# Patient Record
Sex: FEMALE | Race: WHITE | NOT HISPANIC OR LATINO
[De-identification: names, ages, dates, MRNs, and addresses within clinical notes are randomized per-mention and may not be internally consistent; named-entity substitution may affect disease eponyms.]

---

## 2017-10-30 ENCOUNTER — APPOINTMENT (OUTPATIENT)
Dept: GERIATRICS | Facility: CLINIC | Age: 72
End: 2017-10-30
Payer: MEDICARE

## 2017-10-30 VITALS
HEART RATE: 75 BPM | SYSTOLIC BLOOD PRESSURE: 110 MMHG | WEIGHT: 90 LBS | TEMPERATURE: 98.7 F | HEIGHT: 58 IN | OXYGEN SATURATION: 98 % | DIASTOLIC BLOOD PRESSURE: 62 MMHG | BODY MASS INDEX: 18.89 KG/M2

## 2017-10-30 DIAGNOSIS — Z60.2 PROBLEMS RELATED TO LIVING ALONE: ICD-10-CM

## 2017-10-30 DIAGNOSIS — R63.4 ABNORMAL WEIGHT LOSS: ICD-10-CM

## 2017-10-30 DIAGNOSIS — R45.1 RESTLESSNESS AND AGITATION: ICD-10-CM

## 2017-10-30 DIAGNOSIS — Z00.00 ENCOUNTER FOR GENERAL ADULT MEDICAL EXAMINATION W/OUT ABNORMAL FINDINGS: ICD-10-CM

## 2017-10-30 DIAGNOSIS — R41.3 OTHER AMNESIA: ICD-10-CM

## 2017-10-30 PROCEDURE — 99204 OFFICE O/P NEW MOD 45 MIN: CPT | Mod: 25

## 2017-10-30 PROCEDURE — G0505: CPT

## 2017-10-30 RX ORDER — FLUCONAZOLE 150 MG/1
150 TABLET ORAL
Qty: 1 | Refills: 0 | Status: DISCONTINUED | COMMUNITY
Start: 2017-06-25

## 2017-10-30 RX ORDER — DONEPEZIL HYDROCHLORIDE 5 MG/1
5 TABLET ORAL
Qty: 30 | Refills: 0 | Status: DISCONTINUED | COMMUNITY
Start: 2017-04-03

## 2017-10-30 RX ORDER — MEMANTINE HYDROCHLORIDE 7 MG/1
7 CAPSULE, EXTENDED RELEASE ORAL
Qty: 30 | Refills: 0 | Status: DISCONTINUED | COMMUNITY
Start: 2017-06-16

## 2017-10-30 RX ORDER — RIVASTIGMINE TARTRATE 1.5 MG/1
1.5 CAPSULE ORAL
Qty: 30 | Refills: 0 | Status: ACTIVE | COMMUNITY
Start: 2017-07-10

## 2017-10-30 RX ORDER — NITROFURANTOIN (MONOHYDRATE/MACROCRYSTALS) 25; 75 MG/1; MG/1
100 CAPSULE ORAL
Qty: 14 | Refills: 0 | Status: DISCONTINUED | COMMUNITY
Start: 2017-06-25

## 2017-10-30 RX ORDER — VALACYCLOVIR 500 MG/1
500 TABLET, FILM COATED ORAL
Qty: 6 | Refills: 0 | Status: DISCONTINUED | COMMUNITY
Start: 2017-06-08

## 2017-10-30 RX ORDER — LEVOTHYROXINE SODIUM 75 UG/1
75 TABLET ORAL
Qty: 30 | Refills: 0 | Status: ACTIVE | COMMUNITY
Start: 2017-04-11

## 2017-10-30 SDOH — SOCIAL STABILITY - SOCIAL INSECURITY: PROBLEMS RELATED TO LIVING ALONE: Z60.2

## 2017-10-31 PROBLEM — Z60.2 LIVING ALONE: Status: ACTIVE | Noted: 2017-10-31

## 2017-10-31 PROBLEM — Z00.00 ENCOUNTER FOR PREVENTIVE HEALTH EXAMINATION: Status: ACTIVE | Noted: 2017-07-07

## 2017-10-31 LAB
ALBUMIN SERPL ELPH-MCNC: 4.6 G/DL
ALP BLD-CCNC: 69 U/L
ALT SERPL-CCNC: 21 U/L
ANION GAP SERPL CALC-SCNC: 17 MMOL/L
AST SERPL-CCNC: 22 U/L
BASOPHILS # BLD AUTO: 0.04 K/UL
BASOPHILS NFR BLD AUTO: 0.5 %
BILIRUB SERPL-MCNC: 0.2 MG/DL
BUN SERPL-MCNC: 13 MG/DL
CALCIUM SERPL-MCNC: 10.2 MG/DL
CHLORIDE SERPL-SCNC: 106 MMOL/L
CHOLEST SERPL-MCNC: 237 MG/DL
CHOLEST/HDLC SERPL: 3.4 RATIO
CO2 SERPL-SCNC: 24 MMOL/L
CREAT SERPL-MCNC: 1.09 MG/DL
EOSINOPHIL # BLD AUTO: 0.06 K/UL
EOSINOPHIL NFR BLD AUTO: 0.7 %
GLUCOSE SERPL-MCNC: 94 MG/DL
HCT VFR BLD CALC: 44.2 %
HDLC SERPL-MCNC: 70 MG/DL
HGB BLD-MCNC: 14.9 G/DL
IMM GRANULOCYTES NFR BLD AUTO: 0.1 %
LDLC SERPL CALC-MCNC: 142 MG/DL
LYMPHOCYTES # BLD AUTO: 2.08 K/UL
LYMPHOCYTES NFR BLD AUTO: 24.5 %
MAN DIFF?: NORMAL
MCHC RBC-ENTMCNC: 32.2 PG
MCHC RBC-ENTMCNC: 33.7 GM/DL
MCV RBC AUTO: 95.5 FL
MONOCYTES # BLD AUTO: 0.53 K/UL
MONOCYTES NFR BLD AUTO: 6.3 %
NEUTROPHILS # BLD AUTO: 5.76 K/UL
NEUTROPHILS NFR BLD AUTO: 67.9 %
PLATELET # BLD AUTO: 251 K/UL
POTASSIUM SERPL-SCNC: 4.7 MMOL/L
PROT SERPL-MCNC: 7.4 G/DL
RBC # BLD: 4.63 M/UL
RBC # FLD: 12.4 %
SODIUM SERPL-SCNC: 147 MMOL/L
T4 SERPL-MCNC: 10.3 UG/DL
TRIGL SERPL-MCNC: 124 MG/DL
TSH SERPL-ACNC: 0.77 UIU/ML
WBC # FLD AUTO: 8.48 K/UL

## 2019-03-04 PROBLEM — R41.3 MEMORY LOSS: Status: ACTIVE | Noted: 2017-10-30

## 2022-09-08 ENCOUNTER — INPATIENT (INPATIENT)
Facility: HOSPITAL | Age: 77
LOS: 7 days | Discharge: HOME HEALTH | End: 2022-09-16
Attending: STUDENT IN AN ORGANIZED HEALTH CARE EDUCATION/TRAINING PROGRAM | Admitting: STUDENT IN AN ORGANIZED HEALTH CARE EDUCATION/TRAINING PROGRAM

## 2022-09-08 VITALS
TEMPERATURE: 101 F | SYSTOLIC BLOOD PRESSURE: 133 MMHG | RESPIRATION RATE: 18 BRPM | DIASTOLIC BLOOD PRESSURE: 58 MMHG | OXYGEN SATURATION: 99 % | HEART RATE: 87 BPM

## 2022-09-08 DIAGNOSIS — I61.9 NONTRAUMATIC INTRACEREBRAL HEMORRHAGE, UNSPECIFIED: ICD-10-CM

## 2022-09-08 DIAGNOSIS — Z90.89 ACQUIRED ABSENCE OF OTHER ORGANS: Chronic | ICD-10-CM

## 2022-09-08 DIAGNOSIS — Z87.74 PERSONAL HISTORY OF (CORRECTED) CONGENITAL MALFORMATIONS OF HEART AND CIRCULATORY SYSTEM: Chronic | ICD-10-CM

## 2022-09-08 LAB
A1C WITH ESTIMATED AVERAGE GLUCOSE RESULT: 5.1 % — SIGNIFICANT CHANGE UP (ref 4–5.6)
ALBUMIN SERPL ELPH-MCNC: 4.7 G/DL — SIGNIFICANT CHANGE UP (ref 3.3–5)
ALP SERPL-CCNC: 73 U/L — SIGNIFICANT CHANGE UP (ref 40–120)
ALT FLD-CCNC: 29 U/L — SIGNIFICANT CHANGE UP (ref 4–33)
ANION GAP SERPL CALC-SCNC: 13 MMOL/L — SIGNIFICANT CHANGE UP (ref 7–14)
ANION GAP SERPL CALC-SCNC: 15 MMOL/L — HIGH (ref 7–14)
APTT BLD: 27.5 SEC — SIGNIFICANT CHANGE UP (ref 27–36.3)
AST SERPL-CCNC: 30 U/L — SIGNIFICANT CHANGE UP (ref 4–32)
BASE EXCESS BLDV CALC-SCNC: 3.7 MMOL/L — HIGH (ref -2–3)
BASOPHILS # BLD AUTO: 0.01 K/UL — SIGNIFICANT CHANGE UP (ref 0–0.2)
BASOPHILS NFR BLD AUTO: 0.1 % — SIGNIFICANT CHANGE UP (ref 0–2)
BILIRUB SERPL-MCNC: 0.8 MG/DL — SIGNIFICANT CHANGE UP (ref 0.2–1.2)
BLOOD GAS VENOUS COMPREHENSIVE RESULT: SIGNIFICANT CHANGE UP
BUN SERPL-MCNC: 12 MG/DL — SIGNIFICANT CHANGE UP (ref 7–23)
BUN SERPL-MCNC: 13 MG/DL — SIGNIFICANT CHANGE UP (ref 7–23)
CALCIUM SERPL-MCNC: 10.2 MG/DL — SIGNIFICANT CHANGE UP (ref 8.4–10.5)
CALCIUM SERPL-MCNC: 9.4 MG/DL — SIGNIFICANT CHANGE UP (ref 8.4–10.5)
CHLORIDE BLDV-SCNC: 100 MMOL/L — SIGNIFICANT CHANGE UP (ref 96–108)
CHLORIDE SERPL-SCNC: 101 MMOL/L — SIGNIFICANT CHANGE UP (ref 98–107)
CHLORIDE SERPL-SCNC: 96 MMOL/L — LOW (ref 98–107)
CO2 BLDV-SCNC: 28.9 MMOL/L — HIGH (ref 22–26)
CO2 SERPL-SCNC: 24 MMOL/L — SIGNIFICANT CHANGE UP (ref 22–31)
CO2 SERPL-SCNC: 25 MMOL/L — SIGNIFICANT CHANGE UP (ref 22–31)
CREAT SERPL-MCNC: 0.67 MG/DL — SIGNIFICANT CHANGE UP (ref 0.5–1.3)
CREAT SERPL-MCNC: 0.7 MG/DL — SIGNIFICANT CHANGE UP (ref 0.5–1.3)
EGFR: 91 ML/MIN/1.73M2 — SIGNIFICANT CHANGE UP
EGFR: 92 ML/MIN/1.73M2 — SIGNIFICANT CHANGE UP
EOSINOPHIL # BLD AUTO: 0.03 K/UL — SIGNIFICANT CHANGE UP (ref 0–0.5)
EOSINOPHIL NFR BLD AUTO: 0.2 % — SIGNIFICANT CHANGE UP (ref 0–6)
ESTIMATED AVERAGE GLUCOSE: 100 — SIGNIFICANT CHANGE UP
GAS PNL BLDV: 132 MMOL/L — LOW (ref 136–145)
GAS PNL BLDV: SIGNIFICANT CHANGE UP
GLUCOSE BLDV-MCNC: 95 MG/DL — SIGNIFICANT CHANGE UP (ref 70–99)
GLUCOSE SERPL-MCNC: 92 MG/DL — SIGNIFICANT CHANGE UP (ref 70–99)
GLUCOSE SERPL-MCNC: 96 MG/DL — SIGNIFICANT CHANGE UP (ref 70–99)
HCO3 BLDV-SCNC: 28 MMOL/L — SIGNIFICANT CHANGE UP (ref 22–29)
HCT VFR BLD CALC: 45.6 % — HIGH (ref 34.5–45)
HCT VFR BLDA CALC: 44 % — SIGNIFICANT CHANGE UP (ref 34.5–46.5)
HGB BLD CALC-MCNC: 14.6 G/DL — SIGNIFICANT CHANGE UP (ref 11.5–15.5)
HGB BLD-MCNC: 15.5 G/DL — SIGNIFICANT CHANGE UP (ref 11.5–15.5)
IANC: 9.66 K/UL — HIGH (ref 1.8–7.4)
IMM GRANULOCYTES NFR BLD AUTO: 0.6 % — SIGNIFICANT CHANGE UP (ref 0–1.5)
INR BLD: 1.07 RATIO — SIGNIFICANT CHANGE UP (ref 0.88–1.16)
LACTATE BLDV-MCNC: 1.3 MMOL/L — SIGNIFICANT CHANGE UP (ref 0.5–2)
LYMPHOCYTES # BLD AUTO: 1.75 K/UL — SIGNIFICANT CHANGE UP (ref 1–3.3)
LYMPHOCYTES # BLD AUTO: 14.5 % — SIGNIFICANT CHANGE UP (ref 13–44)
MCHC RBC-ENTMCNC: 31.8 PG — SIGNIFICANT CHANGE UP (ref 27–34)
MCHC RBC-ENTMCNC: 34 GM/DL — SIGNIFICANT CHANGE UP (ref 32–36)
MCV RBC AUTO: 93.4 FL — SIGNIFICANT CHANGE UP (ref 80–100)
MONOCYTES # BLD AUTO: 0.56 K/UL — SIGNIFICANT CHANGE UP (ref 0–0.9)
MONOCYTES NFR BLD AUTO: 4.6 % — SIGNIFICANT CHANGE UP (ref 2–14)
NEUTROPHILS # BLD AUTO: 9.66 K/UL — HIGH (ref 1.8–7.4)
NEUTROPHILS NFR BLD AUTO: 80 % — HIGH (ref 43–77)
NRBC # BLD: 0 /100 WBCS — SIGNIFICANT CHANGE UP (ref 0–0)
NRBC # FLD: 0 K/UL — SIGNIFICANT CHANGE UP (ref 0–0)
PCO2 BLDV: 39 MMHG — SIGNIFICANT CHANGE UP (ref 39–42)
PH BLDV: 7.46 — HIGH (ref 7.32–7.43)
PLATELET # BLD AUTO: 212 K/UL — SIGNIFICANT CHANGE UP (ref 150–400)
PO2 BLDV: 24 MMHG — SIGNIFICANT CHANGE UP
POTASSIUM BLDV-SCNC: 4.6 MMOL/L — SIGNIFICANT CHANGE UP (ref 3.5–5.1)
POTASSIUM SERPL-MCNC: 4 MMOL/L — SIGNIFICANT CHANGE UP (ref 3.5–5.3)
POTASSIUM SERPL-MCNC: 5.5 MMOL/L — HIGH (ref 3.5–5.3)
POTASSIUM SERPL-SCNC: 4 MMOL/L — SIGNIFICANT CHANGE UP (ref 3.5–5.3)
POTASSIUM SERPL-SCNC: 5.5 MMOL/L — HIGH (ref 3.5–5.3)
PROT SERPL-MCNC: 7.6 G/DL — SIGNIFICANT CHANGE UP (ref 6–8.3)
PROTHROM AB SERPL-ACNC: 12.4 SEC — SIGNIFICANT CHANGE UP (ref 10.5–13.4)
RBC # BLD: 4.88 M/UL — SIGNIFICANT CHANGE UP (ref 3.8–5.2)
RBC # FLD: 12.9 % — SIGNIFICANT CHANGE UP (ref 10.3–14.5)
SAO2 % BLDV: 32.2 % — SIGNIFICANT CHANGE UP
SARS-COV-2 RNA SPEC QL NAA+PROBE: SIGNIFICANT CHANGE UP
SODIUM SERPL-SCNC: 135 MMOL/L — SIGNIFICANT CHANGE UP (ref 135–145)
SODIUM SERPL-SCNC: 139 MMOL/L — SIGNIFICANT CHANGE UP (ref 135–145)
TROPONIN T, HIGH SENSITIVITY RESULT: 13 NG/L — SIGNIFICANT CHANGE UP
WBC # BLD: 12.08 K/UL — HIGH (ref 3.8–10.5)
WBC # FLD AUTO: 12.08 K/UL — HIGH (ref 3.8–10.5)

## 2022-09-08 PROCEDURE — 70496 CT ANGIOGRAPHY HEAD: CPT | Mod: 26,MA

## 2022-09-08 PROCEDURE — 71046 X-RAY EXAM CHEST 2 VIEWS: CPT | Mod: 26

## 2022-09-08 PROCEDURE — 99291 CRITICAL CARE FIRST HOUR: CPT | Mod: GC

## 2022-09-08 PROCEDURE — 70498 CT ANGIOGRAPHY NECK: CPT | Mod: 26,MA

## 2022-09-08 PROCEDURE — 99291 CRITICAL CARE FIRST HOUR: CPT

## 2022-09-08 PROCEDURE — 70496 CT ANGIOGRAPHY HEAD: CPT | Mod: 26,77

## 2022-09-08 RX ORDER — LEVOTHYROXINE SODIUM 125 MCG
75 TABLET ORAL DAILY
Refills: 0 | Status: DISCONTINUED | OUTPATIENT
Start: 2022-09-08 | End: 2022-09-15

## 2022-09-08 RX ORDER — CHLORHEXIDINE GLUCONATE 213 G/1000ML
1 SOLUTION TOPICAL
Refills: 0 | Status: DISCONTINUED | OUTPATIENT
Start: 2022-09-08 | End: 2022-09-09

## 2022-09-08 RX ORDER — INFLUENZA VIRUS VACCINE 15; 15; 15; 15 UG/.5ML; UG/.5ML; UG/.5ML; UG/.5ML
0.7 SUSPENSION INTRAMUSCULAR ONCE
Refills: 0 | Status: DISCONTINUED | OUTPATIENT
Start: 2022-09-08 | End: 2022-09-16

## 2022-09-08 RX ORDER — SODIUM CHLORIDE 9 MG/ML
1000 INJECTION INTRAMUSCULAR; INTRAVENOUS; SUBCUTANEOUS
Refills: 0 | Status: DISCONTINUED | OUTPATIENT
Start: 2022-09-08 | End: 2022-09-09

## 2022-09-08 RX ORDER — SODIUM CHLORIDE 9 MG/ML
1000 INJECTION, SOLUTION INTRAVENOUS
Refills: 0 | Status: DISCONTINUED | OUTPATIENT
Start: 2022-09-08 | End: 2022-09-08

## 2022-09-08 RX ORDER — ACETAMINOPHEN 500 MG
575 TABLET ORAL ONCE
Refills: 0 | Status: COMPLETED | OUTPATIENT
Start: 2022-09-08 | End: 2022-09-08

## 2022-09-08 RX ADMIN — SODIUM CHLORIDE 70 MILLILITER(S): 9 INJECTION, SOLUTION INTRAVENOUS at 20:16

## 2022-09-08 RX ADMIN — SODIUM CHLORIDE 75 MILLILITER(S): 9 INJECTION INTRAMUSCULAR; INTRAVENOUS; SUBCUTANEOUS at 23:22

## 2022-09-08 RX ADMIN — Medication 230 MILLIGRAM(S): at 23:07

## 2022-09-08 NOTE — ED PROVIDER NOTE - NS ED ROS FT
Gen: No F/C/NS  Head: +fall +head trauma  Eyes: No changes in vision   Resp: No cough or trouble breathing  Cardiovascular: No chest pain or palpitation  Gastroenteric: No N/V/D  :  No change in urine output, dysuria or hematuria   MS: +bruise +leg pain   Neuro: +headache; no abnormal movements  Skin: No new rash

## 2022-09-08 NOTE — ED ADULT NURSE REASSESSMENT NOTE - NS ED NURSE REASSESS COMMENT FT1
Patient is alert and verbally responsive with Hx of early dementia. Patient sustained a fall and hit her head . Patient came as a stroke code then cancelled. No neurological deci fit noted. ct scan shows that patient has a brain bleed. MICU consult in placed. Cardiac monitor is  progress.

## 2022-09-08 NOTE — CONSULT NOTE ADULT - TIME BILLING
76-year-old right-handed lady first evaluated at [facility] on 9/9/2022 with left visual field deficit and agitation.  History and exam as above.  ROS otherwise negative.  CT head (9/8/2022) to my eye showed a moderate sized right occipital lobar hemorrhage, measured at 4 x 2 x 3.9 cm.  CTA neck and head (9/8/2022) reportedly showed (not obvious to my eye) increased vascularity in the region medial to the hemorrhage, consistent with a possible AVM.    Impression.  At baseline suspect at least mild-moderate and possibly moderate dementia.  Presented with agitation and left homonymous hemianopia, due to a right occipital lobar hemorrhage.  Etiology may be amyloid angiopathy, but slightly abnormal vasculature on CTA raised the possibility of an underlying AVM.  Suggest.  MRI brain with contrast; in 3-7 days (1 week per neurosurgery, but I think CTA can be repeated earlier if she is otherwise ready for discharge); if repeat CTA is again suggestive of an AVM, then transfer to [facility] for conventional cerebral angiography; if repeat CTA is unremarkable, then no further neurovascular investigation, although can repeat MRI with contrast/MRA neck and head in about 2 months; keep blood pressure less than 140/90; PT/OT/speech therapy.

## 2022-09-08 NOTE — ED PROVIDER NOTE - PROGRESS NOTE DETAILS
Dano CLAY (PGY-3)   spoke with micu who states nsgy's plan is 4 hr ct/ctv (9PM). potential xfer to [facility] next week. micu attg accepted pt

## 2022-09-08 NOTE — ED PROVIDER NOTE - CLINICAL SUMMARY MEDICAL DECISION MAKING FREE TEXT BOX
75yo F PMH hypothyroidism, dementia, allergies (Xolaire injections monthly) presents today with c/f fall, head trauma. C/f gait changes and dec peripheral vision to L side. Fall risk. Plan to obtain CT head, obtain blood work, urine, give analgesics, reassess.

## 2022-09-08 NOTE — PATIENT PROFILE ADULT - FALL HARM RISK - HARM RISK INTERVENTIONS
Communicate Risk of Fall with Harm to all staff/Reinforce activity limits and safety measures with patient and family/Tailored Fall Risk Interventions/Visual Cue: Yellow wristband and red socks/Bed in lowest position, wheels locked, appropriate side rails in place/Call bell, personal items and telephone in reach/Instruct patient to call for assistance before getting out of bed or chair/Non-slip footwear when patient is out of bed/[place] to call system/Physically safe environment - no spills, clutter or unnecessary equipment/Purposeful Proactive Rounding/Room/bathroom lighting operational, light cord in reach Assistance with ambulation/Assistance OOB with selected safe patient handling equipment/Communicate Risk of Fall with Harm to all staff/Reinforce activity limits and safety measures with patient and family/Review medications for side effects contributing to fall risk/Sit up slowly, dangle for a short time, stand at bedside before walking/Tailored Fall Risk Interventions/Toileting schedule using arm’s reach rule for commode and bathroom/Visual Cue: Yellow wristband and red socks/Bed in lowest position, wheels locked, appropriate side rails in place/Call bell, personal items and telephone in reach/Instruct patient to call for assistance before getting out of bed or chair/Non-slip footwear when patient is out of bed/[place] to call system/Physically safe environment - no spills, clutter or unnecessary equipment/Purposeful Proactive Rounding/Room/bathroom lighting operational, light cord in reach

## 2022-09-08 NOTE — ED PROVIDER NOTE - OBJECTIVE STATEMENT
77yo F PMH hypothyroidism, dementia, allergies (Xolaire injections monthly). Per daughter patient was not her normal self today. Usually very active. She told daugher that she was not feeling right, moving very slow. Daughter recommended a shower. In the shower bruised her leg and head. Brother with patient yesterday, states that she was "really off" yesterday - thinking "night was day and day was night." On the way to the allergist today but upon noting the head trauma, daughter brought the patient to the hospital for further evaluation. Patient does not recall falling. Per daughter - usually a 30 second turnover in memory. Pt cannot remember how or when she fell but family just noted bruise today, may have fallen or injured self x 2 days ago. No nausea or vomiting. No chest pain/abd pain. Currently c/o headache. Family says pt seems low energy compared to her usual self.

## 2022-09-08 NOTE — CONSULT NOTE ADULT - ASSESSMENT
75 yo F PMH dementia presenting w L visual loss and confusion, CTH shows R occpital IPH, CTA shows slightly increased vascularity in the right occipital lobe medial to the parenchymal hemorrhage suspicious for an anterior venous malformation    PLAN:  - Interval CTH 4-6h  - CTV head  - Q1h neuro checks  - Hold all AC    Case discussed with attending neurosurgeon Dr. Lawrence

## 2022-09-08 NOTE — H&P ADULT - ASSESSMENT
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      #Neuro:  ##R occipital IPH  CTH 9/8 @5PM:  1 x 2.1 x 3.9 cm hemorrhage in right occipital lobe.  CTA 9/9: c/f anterior venous malformation  P/w L hemianopsia  [ ] 4 hour repeat CTH - ordered and confirmed with CT dept  [ ] CT venogram - to eval venous malformation , to be done with 4 hour repeat CTH  neuro check q1h  likely 2/2 trauma +/- venous malformation      #Cardiovascular:  -BP  138/58   Strict BP control goal <160/90 w/ Cardene drip if needed    #Pulmonary:  -CXR clear lungs  -on RA, no active issue    #FEN/GI:  -NPO for now pending repeat CTH  -hx of celiac dz    #Renal:  -IVF to prevent contrast VANE    #:  -UA, possible UCx    #ID:  ##Fever  -T 100.6, WBC 13, may be reactive from IPH, no localizing symptoms  CXR clear lungs  [ ] f/u bcx sent by  ED  [ ] UA and UCx ordered  Monitor off abx    #Heme:  -No chemical AC due to bleed  SCDs    #Endo:  -Hx of grave's dz, s/p radioactive iodine, now hypothyroid on synthroid  c/w home synthroid    #Ethics:  -2 children Sinan [phone]  Samantha [phone]  full code 76F PMH hypothyroidism, Celiac disease, allergies, dementia (B/l aaox2), 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      #Neuro:  ##R occipital IPH  CTH 9/8 @5PM:  1 x 2.1 x 3.9 cm hemorrhage in right occipital lobe.  CTA 9/9: c/f anterior venous malformation  P/w L hemianopsia  [ ] 4 hour repeat CTH - ordered and confirmed with CT dept  [ ] CT venogram - to eval venous malformation , to be done with 4 hour repeat CTH  neuro check q1h  likely 2/2 trauma +/- venous malformation  neurosx: no acute intervention per phone discussion  neurology: MR brain, TTE ordered. bedside dysphagia tmr      #Cardiovascular:  -BP  138/58   Strict BP control goal <160/90 w/ Cardene drip if needed    #Pulmonary:  -CXR clear lungs  -on RA, no active issue    #FEN/GI:  -NPO for now pending repeat CTH  -hx of celiac dz    #Renal:  -IVF to prevent contrast VANE    #:  -UA, possible UCx    #ID:  ##Fever  -T 100.6, WBC 13, may be reactive from IPH, no localizing symptoms  CXR clear lungs  [ ] f/u bcx sent by  ED  [ ] UA and UCx ordered  Monitor off abx    #Heme:  -No chemical AC due to bleed  SCDs    #Endo:  -Hx of grave's dz, s/p radioactive iodine, now hypothyroid on synthroid  c/w home synthroid    #Ethics:  -2 children Sinan [phone]  Samantha [phone]  full code

## 2022-09-08 NOTE — ED ADULT NURSE NOTE - OBJECTIVE STATEMENT
Vinay RN: Pt presents A&Ox3, brought in by daughter for increased confusion and decrease in mobility that started yesterday. PMHx of dementia, but per daughter pt has been more confused and walking slower. Daughter states she also noticed a bruise on pt L thight area and L temporal. Pt does not remember falling. Pt walks around at home w/o any assistance. Code stroke activated by MD. Pt moved from CT to rm13, respirations even and unlabored, sating 100% on RA, NSR on monitor. Old bruising noted on L thigh and L side of head. No active bleeding present. +pulse motor sensation and strength equal and present in all 4 exts, PERRLA intact. Pt denies any chest pain, sob, headache, N/V/D, or visual disturbances. 20g IV placed in L AC, labs sent . Neuro at bedside. Report given to primary RN. bed in lowest position, side rails up, call bell in hand, safety maintained.

## 2022-09-08 NOTE — CHART NOTE - NSCHARTNOTEFT_GEN_A_CORE
Case discussed with rads resident. Stable size of IPH. AVM appeared more prominent on CT venogram then initial CTA. Full report to be dictated in the AM. Stable exam discussed with patient and daughter at bedside.    AS, pgy3

## 2022-09-08 NOTE — ED ADULT TRIAGE NOTE - CHIEF COMPLAINT QUOTE
Pt AOX2 (unsure of time and place) Hx of dementia; pt suffered a head injury possible fall, bruise noted to Left temple area; pt cannot remember how or when she fell but family just noted bruise today, may have fallen or injured self x 2 days ago; pt has Hx of allergic hives and gets Xolaire injection monthly and takes synthroid; family says pt seems low energy compared to her usual self
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
## 2022-09-08 NOTE — CONSULT NOTE ADULT - SUBJECTIVE AND OBJECTIVE BOX
NEUROSURGERY CONSULT    Consulted for: R occipital IPH    HPI: 77 yo female PMH dementia, pediatric heart surgery for patent ductus arteriosus, hypothyroidism presenting for L sided visual loss and increasing confusion. At baseline, the patient typically knows the location and date but does fluctuate with the date but forgets recent conversations and new information presented to her, but is otherwise independent in ambulation. She was noted to have a fall and confusion at around 2200 last night and this morning was noted to have difficulty seeing on the L side of her visual field. The patient had been noted to have increasing forgetfulness over the last week as well as per the son and daughter. Patient went to an allergist today and then was noted at that time to be having difficulty seeing things on the L side of her body. Does have a recent travel history to [facility]. Patient stated that she is feeling well and did not have any complaints.     RADIOLOGY:     MEDS:      PHYSICAL EXAM:  Vital Signs Last 24 Hrs  T(C): 38.1 (08 Sep 2022 14:29), Max: 38.1 (08 Sep 2022 14:29)  T(F): 100.6 (08 Sep 2022 14:29), Max: 100.6 (08 Sep 2022 14:29)  HR: 87 (08 Sep 2022 14:29) (87 - 87)  BP: 133/58 (08 Sep 2022 14:29) (133/58 - 133/58)  BP(mean): --  RR: 18 (08 Sep 2022 14:29) (18 - 18)  SpO2: 99% (08 Sep 2022 14:29) (99% - 99%)        LABS:                        15.5   12.08 )-----------( 212      ( 08 Sep 2022 16:56 )             45.6           PT/INR - ( 08 Sep 2022 16:56 )   PT: 12.4 sec;   INR: 1.07 ratio         PTT - ( 08 Sep 2022 16:56 )  PTT:27.5 sec           NEUROSURGERY CONSULT    Consulted for: R occipital IPH    HPI: 75 yo female PMH dementia, pediatric heart surgery for patent ductus arteriosus, hypothyroidism presenting for L sided visual loss and increasing confusion. At baseline, the patient typically knows the location and date but does fluctuate with the date but forgets recent conversations and new information presented to her, but is otherwise independent in ambulation. She was noted to have a fall and confusion at around 2200 last night and this morning was noted to have difficulty seeing on the L side of her visual field. The patient had been noted to have increasing forgetfulness over the last week as well as per the son and daughter. Patient went to an allergist today and then was noted at that time to be having difficulty seeing things on the L side of her body. Does have a recent travel history to [facility]. Patient stated that she is feeling well and did not have any complaints.     RADIOLOGY:   < from: CT Brain Stroke Protocol (09.08.22 @ 17:02) >  IMPRESSION:  Acute intracranial hemorrhage within the right occipital lobe with   surrounding edema and extension of hemorrhage into the right lateral   ventricle. Mild mass effect on the right lateral ventricle. No midline   shift.    < from: CT Angio Neck Stroke Protocol w/ IV Cont (09.08.22 @ 17:02) >  IMPRESSION: Normal CTA of the neck. There is slightly increased   vascularity in the right occipital lobe medial to the parenchymal   hemorrhage suspicious for an anterior venous malformation.    PHYSICAL EXAM:  Vital Signs Last 24 Hrs  T(C): 38.1 (08 Sep 2022 14:29), Max: 38.1 (08 Sep 2022 14:29)  T(F): 100.6 (08 Sep 2022 14:29), Max: 100.6 (08 Sep 2022 14:29)  HR: 87 (08 Sep 2022 14:29) (87 - 87)  BP: 133/58 (08 Sep 2022 14:29) (133/58 - 133/58)  BP(mean): --  RR: 18 (08 Sep 2022 14:29) (18 - 18)  SpO2: 99% (08 Sep 2022 14:29) (99% - 99%)    AOx2, appropriate, follows commands  PERRL, EOMI, face symmetrical, L peripheral vision loss  KHAN x 4 with 5/5 strength   Sensation intact to light touch   No pronator drift         LABS:                        15.5   12.08 )-----------( 212      ( 08 Sep 2022 16:56 )             45.6           PT/INR - ( 08 Sep 2022 16:56 )   PT: 12.4 sec;   INR: 1.07 ratio         PTT - ( 08 Sep 2022 16:56 )  PTT:27.5 sec

## 2022-09-08 NOTE — CONSULT NOTE ADULT - SUBJECTIVE AND OBJECTIVE BOX
HPI:  Nicole Casillas is a 76 year old RH female with a past medical history of dementia, pediatric heart surgery for patent ductus arteriosus, hypothyroidism presenting for L sided visual loss and increasing confusion. At baseline, the patient typically knows the location and date but does fluctuate with the date but forgets recent conversations and new information presented to her, but is otherwise independent in ambulation. She was noted to have a fall and confusion at around 2200 last night and this morning was noted to have difficulty seeing on the L side of her visual field. The patient had been noted to have increasing forgetfulness over the last week as well as per the son and daughter. Patient went to an allergist today and then was noted at that time to be having difficulty seeing things on the L side of her body. Does have a recent travel history to [facility]. Patient stated that she is feeling well and did not have any complaints.     NIHSS: 4  MRS: 1  ICH: 1    REVIEW OF SYSTEMS    A 10-system ROS was performed and is negative except for those items noted above and/or in the HPI.    PAST MEDICAL & SURGICAL HISTORY:  dementia  pediatric heart surgery for patent ductus arteriosus  hypothyroidism    FAMILY HISTORY:  non-contributory    SOCIAL HISTORY:   T/E/D: denied smoking, alcohol, illicit drugs  Occupation: former hairdresser  Lives with: family in independent unit    ALLERGIES/INTOLERANCES:  Allergies  Allergy Status Unknown    Intolerances    VITALS & EXAMINATION:  Vital Signs Last 24 Hrs  T(C): 38.1 (08 Sep 2022 14:29), Max: 38.1 (08 Sep 2022 14:29)  T(F): 100.6 (08 Sep 2022 14:29), Max: 100.6 (08 Sep 2022 14:29)  HR: 87 (08 Sep 2022 14:29) (87 - 87)  BP: 133/58 (08 Sep 2022 14:29) (133/58 - 133/58)  BP(mean): --  RR: 18 (08 Sep 2022 14:29) (18 - 18)  SpO2: 99% (08 Sep 2022 14:29) (99% - 99%)      General:  Constitutional: Appears stated age, in no apparent distress including pain  Head: Normocephalic & atraumatic.    Neurological (>12):  MS: Awake, alert, oriented to person, location but not time. Normal affect. Follows all commands. At times appears to may have some decreased attention to L side.     Language: Speech is clear, fluent with good repetition.    CNs: PERRL (R = 3mm, L = 3mm). Left homonymous hemianopia. EOMI no nystagmus. V1-3 intact to LT, well developed masseter muscles b/l. No facial asymmetry b/l, full eye closure strength b/l. Symmetric palate elevation in midline. Shoulder shrug intact b/l. Tongue midline, normal movements, no atrophy.    Motor: Normal muscle bulk & tone. No noticeable tremor or seizure. No pronator drift.              Deltoid	Biceps	Triceps	   R	5	5	5	5		  L	5	5	5	5	    	H-Flex	H-Ext	K-Flex	K-Ext	D-Flex	P-Flex  R	5	5	5	5	5	5		   L	5	5	5	5	5	5		     Sensation: Intact to LT b/l throughout.     Reflexes:              Biceps(C5)       BR(C6)     Triceps(C7)               Patellar(L4)    Achilles(S1)    Plantar Resp  R	2	          2	             2		        2		    2		Down   L	2	          2	             2		        2		    2		Down     Coordination: No dysmetria to FTN    Gait: Deferred.    LABORATORY:    STUDIES & IMAGING:    Radiology (XR, CT, MR, U/S, TTE/MARCY):     HPI:  Nicole Casillas is a 76 year old RH female with a past medical history of dementia, pediatric heart surgery for patent ductus arteriosus, hypothyroidism presenting for L sided visual loss and increasing confusion. At baseline, the patient typically knows the location and date but does fluctuate with the date but forgets recent conversations and new information presented to her, but is otherwise independent in ambulation. She was noted to have a fall and confusion at around 2200 last night and this morning was noted to have difficulty seeing on the L side of her visual field. The patient had been noted to have increasing forgetfulness over the last week as well as per the son and daughter. Patient went to an allergist today and then was noted at that time to be having difficulty seeing things on the L side of her body. Does have a recent travel history to [facility]. Patient stated that she is feeling well and did not have any complaints.     NIHSS: 4  MRS: 1  ICH: 1    REVIEW OF SYSTEMS    A 10-system ROS was performed and is negative except for those items noted above and/or in the HPI.    PAST MEDICAL & SURGICAL HISTORY:  dementia  pediatric heart surgery for patent ductus arteriosus  hypothyroidism    FAMILY HISTORY:  non-contributory    SOCIAL HISTORY:   T/E/D: denied smoking, alcohol, illicit drugs  Occupation: former hairdresser  Lives with: family in independent unit    ALLERGIES/INTOLERANCES:  Allergies  Allergy Status Unknown    Intolerances    VITALS & EXAMINATION:  Vital Signs Last 24 Hrs  T(C): 38.1 (08 Sep 2022 14:29), Max: 38.1 (08 Sep 2022 14:29)  T(F): 100.6 (08 Sep 2022 14:29), Max: 100.6 (08 Sep 2022 14:29)  HR: 87 (08 Sep 2022 14:29) (87 - 87)  BP: 133/58 (08 Sep 2022 14:29) (133/58 - 133/58)  BP(mean): --  RR: 18 (08 Sep 2022 14:29) (18 - 18)  SpO2: 99% (08 Sep 2022 14:29) (99% - 99%)      General:  Constitutional: Appears stated age, in no apparent distress including pain  Head: Normocephalic & atraumatic.    Neurological (>12):  MS: Awake, alert, oriented to person, location but not time. Normal affect. Follows all commands. At times appears to may have some decreased attention to L side.     Language: Speech is clear, fluent with good repetition.    CNs: PERRL (R = 3mm, L = 3mm). Left homonymous hemianopia. EOMI no nystagmus. V1-3 intact to LT, well developed masseter muscles b/l. No facial asymmetry b/l, full eye closure strength b/l. Symmetric palate elevation in midline. Shoulder shrug intact b/l. Tongue midline, normal movements, no atrophy.    Motor: Normal muscle bulk & tone. No noticeable tremor or seizure. No pronator drift.              Deltoid	Biceps	Triceps	   R	5	5	5	5		  L	5	5	5	5	    	H-Flex	H-Ext	K-Flex	K-Ext	D-Flex	P-Flex  R	5	5	5	5	5	5		   L	5	5	5	5	5	5		     Sensation: Intact to LT b/l throughout.     Reflexes:              Biceps(C5)       BR(C6)     Triceps(C7)               Patellar(L4)    Achilles(S1)    Plantar Resp  R	2	          2	             2		        2		    2		Down   L	2	          2	             2		        2		    2		Down     Coordination: No dysmetria to FTN    Gait: Deferred.    LABORATORY:    STUDIES & IMAGING:    Radiology (XR, CT, MR, U/S, TTE/MARCY):    CT Brain Stroke Protocol (09.08.22 @ 17:02)  IMPRESSION:  Acute intracranial hemorrhage within the right occipital lobe with   surrounding edema and extension of hemorrhage into the right lateral   ventricle. Mild mass effect on the right lateral ventricle. No midline   shift.    Please see separately dictated report for CTA neck/brain findings.    CT Angio Neck Stroke Protocol w/ IV Cont (09.08.22 @ 17:02)  IMPRESSION: Normal CTA of the neck. There is slightly increased   vascularity in the right occipital lobe medial to the parenchymal   hemorrhage suspicious for an anterior venous malformation.

## 2022-09-08 NOTE — ED PROVIDER NOTE - ATTENDING CONTRIBUTION TO CARE
Patient is a 75 yo F with history of dementia, hypothyroidism brought in by daughter for evaluation of unwitnessed head trauma, increasing confusion and concern that patient cannot see on left side. Per daughter, patient is usually somewhat confused but today was acting off of her baseline. Patient told daughter that she wasn't feeling well. Per daughter, patient's son also thought patient was 'off' yesterday. On way to allergist today, daughter noted bruising on her head which patient does not recall how she got. She denies falls. She was also concerned that she couldn't see properly on the left side.     VS noted  Gen. no acute distress, Non toxic   HEENT: PERRL, EOMI, mmm, bruising and hematoma noted on left forehead  Lungs: CTAB/L no C/ W /R   CVS: RRR   Abd; Soft non tender, non distended   Ext: no edema  Skin: no rash  Neuro: awake, alert, KHAN, strength is equal bilaterally, ? left sided hemianopsia, clear speech  a/p: confusion/ hemianopsia/ bruising - concern for stroke vs traumatic injury. CODE STROKE called. Patient was evaluated at CT Scan with neurology. CT Showed: Acute intracranial hemorrhage within the right occipital lobe with surrounding edema and extension of hemorrhage into the right lateral ventricle. Mild mass effect on the right lateral ventricle. No midline shift.    Neurosurgery consulted. MICU consulted.     - Amanda CLAY Patient is a 75 yo F with history of dementia, hypothyroidism brought in by daughter for evaluation of unwitnessed head trauma, increasing confusion and concern that patient cannot see on left side. Per daughter, patient is usually somewhat confused but today was acting off of her baseline. Patient told daughter that she wasn't feeling well. Per daughter, patient's son also thought patient was 'off' yesterday. On way to allergist today, daughter noted bruising on her head which patient does not recall how she got. She denies falls. She was also concerned that she couldn't see properly on the left side.     VS noted  Gen. no acute distress, Non toxic   HEENT: PERRL, EOMI, mmm, bruising and hematoma noted on left forehead  Lungs: CTAB/L no C/ W /R   CVS: RRR   Abd; Soft non tender, non distended   Ext: no edema  Skin: no rash  Neuro: awake, alert, KHAN, strength is equal bilaterally, ? left sided hemianopsia, clear speech  a/p: confusion/ hemianopsia/ bruising - concern for stroke vs traumatic injury. CODE STROKE called. Patient was evaluated at CT Scan with neurology. CT Showed: Acute intracranial hemorrhage within the right occipital lobe with surrounding edema and extension of hemorrhage into the right lateral ventricle. Mild mass effect on the right lateral ventricle. No midline shift.    CTA: Normal CTA of the neck. There is slightly increased vascularity in the right occipital lobe medial to the parenchymal hemorrhage suspicious for an anterior venous malformation.    Neurosurgery consulted. MICU consulted.     - Amanda CLAY

## 2022-09-08 NOTE — CONSULT NOTE ADULT - ASSESSMENT
Nicole Casillas is a 76 year old RH female with a past medical history of dementia, pediatric heart surgery for patent ductus arteriosus, hypothyroidism presenting for L sided visual loss and increasing confusion.    Impression: Left homonymous hemianopia and confusion secondary to R occipital love hemorrhage possibly traumatic in nature versus secondary to amyloid angiopathy in setting of known dementia.    Recommendations:  [] Strict BP control goal <160/90 w/ Cardene drip if needed  [] NSx consult  [] Repeat CTH in 4-6hrs or prn for worsening mental status or neuro exam  [] q2 neuro checks, vitals  [] hold ASA, lovenox, use mechanical DVT prophylaxis for now  [] Telemetry Monitoring  [] MRI brain w/ and w/o cont to look for underlying lesions, malformations.   [] HgA1c, Lipid profile  [] PT/OT  [] TTE  [] signs of increased intracranial pressure or herniation (stupor/coma, nausea, vomiting, acute hypertension with bradycardia, unilateral dilated pupil), hypertonic saline (23.4%) administration  [] Keep T< 38.0° C (100.1° F) with acetaminophen and/or cooling blanket  [] BG  ml/dL  [] Target serum sodium 145-155mEq/L  [] Elevate head of bed to 30º  [] SS    The management and treatment decisions which include alteplase and mechanical thrombectomy were discussed with stroke fellow Dr. Eric Small under the supervision of neurovascular attending Dr. Richard Libman.  Nicole Casillas is a 76 year old RH female with a past medical history of dementia, pediatric heart surgery for patent ductus arteriosus, hypothyroidism presenting for L sided visual loss and increasing confusion.    Impression: Left homonymous hemianopia and confusion secondary to R occipital love hemorrhage possibly traumatic in nature versus secondary to amyloid angiopathy in setting of known dementia.    Recommendations:  [] Strict BP control goal <160/90 w/ Cardene drip if needed  [] NSx consult  [] Repeat CTH in 4-6hrs or prn for worsening mental status or neuro exam  [] q2 neuro checks, vitals  [] hold ASA, lovenox, use mechanical DVT prophylaxis for now  [] Telemetry Monitoring  [] MRI brain w/ and w/o cont to look for underlying lesions, malformations.   [] HgA1c, Lipid profile  [] PT/OT  [] TTE  [] signs of increased intracranial pressure or herniation (stupor/coma, nausea, vomiting, acute hypertension with bradycardia, unilateral dilated pupil), hypertonic saline (23.4%) administration  [] Keep T< 38.0° C (100.1° F) with acetaminophen and/or cooling blanket  [] BG  ml/dL  [] Target serum sodium 140-150mEq/L  [] Elevate head of bed to 30º  [] SS    The management and treatment decisions which include alteplase and mechanical thrombectomy were discussed with stroke fellow Dr. Eric Small under the supervision of neurovascular attending Dr. Richard Libman.

## 2022-09-08 NOTE — H&P ADULT - NSHPPHYSICALEXAM_GEN_ALL_CORE
VITALS:   T(C): 38.1 (09-08-22 @ 14:29), Max: 38.1 (09-08-22 @ 14:29)  HR: 84 (09-08-22 @ 17:38) (84 - 87)  BP: 141/74 (09-08-22 @ 17:40) (133/58 - 163/70)  RR: 16 (09-08-22 @ 17:38) (16 - 18)  SpO2: 99% (09-08-22 @ 17:38) (99% - 99%)    GENERAL: NAD, lying in bed comfortably  HEAD:  + bruise L forehead no large hematoma  EYES: conjunctiva and sclera clear  ENT: Moist mucous membranes  NECK: Supple, No JVD  CHEST/LUNG: Clear to auscultation bilaterally; No rales, rhonchi, wheezing, or rubs. Unlabored respirations  HEART: Regular rate and rhythm; No murmurs, rubs, or gallops  ABDOMEN: BSx4; Soft, nontender, nondistended  EXTREMITIES:   No clubbing, cyanosis, or edema + bruise L upper thigh no hematoma  NERVOUS SYSTEM:  +aaox2 to self and location. CN exam with L hemianopsia. B/l UE 5/5, b/l LE 5/5.   SKIN: No rashes or lesions  Psych: Normal speech, normal behavior, normal affect, +poor memory

## 2022-09-08 NOTE — H&P ADULT - NSHPREVIEWOFSYSTEMS_GEN_ALL_CORE
REVIEW OF SYSTEMS:    CONSTITUTIONAL: No weakness, fevers or chills  EYES/ENT: No visual changes;  No vertigo or throat pain   NECK: No pain or stiffness  HEENT: tenderness L forehead  RESPIRATORY: No cough, wheezing, hemoptysis; No shortness of breath  CARDIOVASCULAR: No chest pain or palpitations  GASTROINTESTINAL: No abdominal or epigastric pain. No nausea, vomiting, or hematemesis; No diarrhea or constipation. No melena or hematochezia.  GENITOURINARY: No dysuria, frequency or hematuria  NEUROLOGICAL: No numbness or weakness  SKIN: No itching, burning, rashes, or lesions   HEME: no easy bruising or unexplained bleeding  ENDO: no heat intolerance, no cold intolerance  PSYCH: no SI, or depression  All other review of systems is negative unless indicated above.

## 2022-09-08 NOTE — H&P ADULT - HISTORY OF PRESENT ILLNESS
76F PMH hypothyroidism, allergies, dementia (B/l aaox2), PSH PDA repair as a child, p/w "not being her normal self". Hx from son -Sinan and daughter - Samantha at bedside. Pt has dementia and poor memory for past 6-7 months, often asking repetitive questions. At baseline very active, walks 3 miles a day, converses normally, just poor memory. Baseline aaox2. Monday night, pt c/o HA. Wed pt noted to be more "sleepy", but family thought she may have just been tired from going to sleep late the night prior. Then today, Thursday, she was sleeping later than usual. When daughter went to check on her, she was standing in front of patient on patient's L side but her mother was unable to see her. She also noted an abnormal gait. This prompted her to bring her to the ED. She also noted a bruise on the L forehead of patient and L upper thigh, thinks she may have fell in the shower possibly. Daughter notes pt tends to deny having symptoms so as not to worry the family.    Pt aaox2, talkative and pleasant. Poor memory during conversation. Denies any HA, CP, SOB, abd pain, n/v/d. She was unable to specificy if she has visual changes. Denies any focal weakness, numbness tingling. Reports tenderness at location of L forehead. Pt is not on any blood thinners, denies any OTC ASA or NSAIDs. Pt unsure if she had any head trauma.    T 100.6, HR 87, /58, RR 18, 99%. CTH revealed "A 4.1 x 2.1 x 3.9 cm focus of hyperdensity within the right occipital lobe with surrounding edema, consistent with acute hemorrhage. There is extension of hemorrhage into the right lateral ventricle occipital horn." CTA with " IMPRESSION: Normal CTA of the neck. There is slightly increased vascularity in the right occipital lobe medial to the parenchymal hemorrhage suspicious for an anterior venous malformation"    76F PMH hypothyroidism, Celiac disease, allergies, dementia (B/l aaox2), PSH PDA repair as a child, p/w "not being her normal self". Hx from son -Sinan and daughter - Samantha at bedside. Pt has dementia and poor memory for past 6-7 months, often asking repetitive questions. At baseline very active, walks 3 miles a day, converses normally, just poor memory. Baseline aaox2. Monday night, pt c/o HA. Wed pt noted to be more "sleepy", but family thought she may have just been tired from going to sleep late the night prior. Then today, Thursday, she was sleeping later than usual. When daughter went to check on her, she was standing in front of patient on patient's L side but her mother was unable to see her. She also noted an abnormal gait. This prompted her to bring her to the ED. She also noted a bruise on the L forehead of patient and L upper thigh, thinks she may have fell in the shower possibly. Daughter notes pt tends to deny having symptoms so as not to worry the family.    Pt aaox2, talkative and pleasant. Poor memory during conversation. Denies any HA, CP, SOB, abd pain, n/v/d. She was unable to specificy if she has visual changes. Denies any focal weakness, numbness tingling. Reports tenderness at location of L forehead. Pt is not on any blood thinners, denies any OTC ASA or NSAIDs. Pt unsure if she had any head trauma.    T 100.6, HR 87, /58, RR 18, 99%. CTH revealed "A 4.1 x 2.1 x 3.9 cm focus of hyperdensity within the right occipital lobe with surrounding edema, consistent with acute hemorrhage. There is extension of hemorrhage into the right lateral ventricle occipital horn." CTA with " IMPRESSION: Normal CTA of the neck. There is slightly increased vascularity in the right occipital lobe medial to the parenchymal hemorrhage suspicious for an anterior venous malformation"

## 2022-09-08 NOTE — H&P ADULT - ATTENDING COMMENTS
76F PMH hypothyroidism, Celiac disease, allergies, dementia (B/l aaox2), p/w "not feeling herself", L hemianopsia, trauma to head found to have 4.1 x 2.1 x 3.9 cm hemorrhage in right occipital lobe.  repeat CT head CTV in 4-6 hours (scheduled for 9pm)  q1H neuro checks  BP control  neuro and neurosurgery f/u   hold AC  MRI  repeat CTA in one week per NSX  DVTppx scds given ICH  Full code

## 2022-09-08 NOTE — H&P ADULT - NSHPLABSRESULTS_GEN_ALL_CORE
.  LABS:                         15.5   12.08 )-----------( 212      ( 08 Sep 2022 16:56 )             45.6     09-08    135  |  96<L>  |  12  ----------------------------<  92  4.0   |  24  |  0.67    Ca    9.4      08 Sep 2022 18:03    TPro  7.6  /  Alb  4.7  /  TBili  0.8  /  DBili  x   /  AST  30  /  ALT  29  /  AlkPhos  73  09-08    PT/INR - ( 08 Sep 2022 16:56 )   PT: 12.4 sec;   INR: 1.07 ratio         PTT - ( 08 Sep 2022 16:56 )  PTT:27.5 sec          RADIOLOGY, EKG & ADDITIONAL TESTS: Reviewed.     < from: CT Angio Neck Stroke Protocol w/ IV Cont (09.08.22 @ 17:02) >    IMPRESSION: Normal CTA of the neck. There is slightly increased   vascularity in the right occipital lobe medial to the parenchymal   hemorrhage suspicious for an anterior venous malformation.    --- End of Report ---          CHANTAL ZAPIEN MD; Resident Radiologist  This document has been electronically signed.  LINDA MULTANI MD; Attending Radiologist  This document has been electronically signed. Sep  8 2022  5:44PM    < end of copied text >    < from: CT Brain Stroke Protocol (09.08.22 @ 17:02) >    ACC: [id number] EXAM:  CT BRAIN STROKE PROTOCOL                          PROCEDURE DATE:  09/08/2022          INTERPRETATION:  CLINICAL INDICATION: Stroke code. Confusion since   yesterday. Status post fall with head strike in shower sometime over the   past 2 days. Now with headache.    TECHNIQUE:  Noncontrast CT of the head was performed.  Sagittal and coronal reformats were created.    COMPARISON STUDY: None    FINDINGS:    PARENCHYMA AND VENTRICLES: A 4.1 x 2.1 x 3.9 cm focus of hyperdensity   within the right occipital lobe with surrounding edema, consistent with   acute hemorrhage. There is extension of hemorrhage into the right lateral   ventricle occipital horn. There is a short hematocrit level in the left   occipital horn. Mild masseffect on the right lateral ventricle. There is   no midline shift. No hydrocephalus. Prominence of the sulci and   ventricles, consistent with age-related parenchymal volume loss. Small   vessel white matter changes.  EXTRA-AXIAL: No abnormal extraaxial collection.  PARANASAL SINUSES: Within normal limits.  TYMPANOMASTOID CAVITIES: Within normal limits.  ORBITS: Within normal limits.  CALVARIUM: Within normal limits.    IMPRESSION:  Acute intracranial hemorrhage within the right occipital lobe with   surrounding edema and extension of hemorrhage into the right lateral   ventricle. Mild mass effect on the right lateral ventricle. No midline   shift.    Please see separately dictated report for CTA neck/brain findings.    Findings were discussed with neurology provider Cameron by Dr. Zapien on   9/8/2022 5:07 PM with read back confirmation.    --- End of Report ---          CHANTAL ZAPIEN MD; Resident Radiologist  This document has been electronically signed.  LINDA MULTANI MD; Attending Radiologist  This document has been electronically signed. Sep  8 2022  5:17PM

## 2022-09-08 NOTE — ED PROVIDER NOTE - PHYSICAL EXAMINATION
G: elderly female in NAD, cooperative with exam   H: NC. Bruising noted to the L temple. Neck: no c spine tenderness.   E: EOMI, dec peripheral vision to the L eye. Snellen: 20/50 R;   M: Mucous membranes moist   R: CTABL, nWOB  C: RRR  A: Soft, NT/ND, no rebound/guarding   MSK: no calf tenderness, no LE edema. Bruising noted to the R knee.  Neuro: CN2-12 grossly intact, A&Ox2, MS +5/5 in UE and LE BL, gross sensation intact in UE and LE BL **gait**

## 2022-09-08 NOTE — ED ADULT NURSE REASSESSMENT NOTE - NS ED NURSE REASSESS COMMENT FT1
Mobile Critical Care RN: patient is 76/F PMHx hypothyroidism, Celiac disease, allergies, dementia (B/l aaox2), PSH PDA repair as a child, p/w "not being her normal self". At baseline very active, walks 3 miles a day, converses normally, just poor memory. Baseline aaox2. daughter noticed bruide of L side of forhead and L upper thigh, patient also c/o headache. unable to confirm fall due to patient denying symptoms. In the ER, CTH + for R occipital lobe bleed. patient remains in room 13 at this time. daughter at bedside. patient is alert and oriented to name and , unable to state the year or current president. patient denies pain, refusing rectal temperature, low grade fever 100.6 orally. neuro check completed and L visual deficits noted. comfortable on room air, NSR and hemodynamically stable, SBP maintained below 160. PIV x1. LR initiated at 70ml/hr. NPO at this time. voids spontaneously. UA pending at this time. bruising noted to left forehead and thigh. repeat CT scan to be performed and MICU transfer pending.

## 2022-09-09 LAB
ALBUMIN SERPL ELPH-MCNC: 3.3 G/DL — SIGNIFICANT CHANGE UP (ref 3.3–5)
ALP SERPL-CCNC: 52 U/L — SIGNIFICANT CHANGE UP (ref 40–120)
ALT FLD-CCNC: 20 U/L — SIGNIFICANT CHANGE UP (ref 4–33)
ANION GAP SERPL CALC-SCNC: 12 MMOL/L — SIGNIFICANT CHANGE UP (ref 7–14)
APPEARANCE UR: CLEAR — SIGNIFICANT CHANGE UP
APTT BLD: 25.5 SEC — LOW (ref 27–36.3)
AST SERPL-CCNC: 21 U/L — SIGNIFICANT CHANGE UP (ref 4–32)
BACTERIA # UR AUTO: NEGATIVE — SIGNIFICANT CHANGE UP
BASOPHILS # BLD AUTO: 0 K/UL — SIGNIFICANT CHANGE UP (ref 0–0.2)
BASOPHILS NFR BLD AUTO: 0 % — SIGNIFICANT CHANGE UP (ref 0–2)
BILIRUB SERPL-MCNC: 0.7 MG/DL — SIGNIFICANT CHANGE UP (ref 0.2–1.2)
BILIRUB UR-MCNC: NEGATIVE — SIGNIFICANT CHANGE UP
BUN SERPL-MCNC: 8 MG/DL — SIGNIFICANT CHANGE UP (ref 7–23)
CALCIUM SERPL-MCNC: 7.2 MG/DL — LOW (ref 8.4–10.5)
CHLORIDE SERPL-SCNC: 106 MMOL/L — SIGNIFICANT CHANGE UP (ref 98–107)
CO2 SERPL-SCNC: 18 MMOL/L — LOW (ref 22–31)
COLOR SPEC: YELLOW — SIGNIFICANT CHANGE UP
CREAT SERPL-MCNC: 0.54 MG/DL — SIGNIFICANT CHANGE UP (ref 0.5–1.3)
DIFF PNL FLD: NEGATIVE — SIGNIFICANT CHANGE UP
EGFR: 97 ML/MIN/1.73M2 — SIGNIFICANT CHANGE UP
EOSINOPHIL # BLD AUTO: 0.06 K/UL — SIGNIFICANT CHANGE UP (ref 0–0.5)
EOSINOPHIL NFR BLD AUTO: 0.7 % — SIGNIFICANT CHANGE UP (ref 0–6)
EPI CELLS # UR: 0 /HPF — SIGNIFICANT CHANGE UP (ref 0–5)
GLUCOSE SERPL-MCNC: 77 MG/DL — SIGNIFICANT CHANGE UP (ref 70–99)
GLUCOSE UR QL: NEGATIVE — SIGNIFICANT CHANGE UP
HCT VFR BLD CALC: 36.9 % — SIGNIFICANT CHANGE UP (ref 34.5–45)
HGB BLD-MCNC: 12.9 G/DL — SIGNIFICANT CHANGE UP (ref 11.5–15.5)
IANC: 5.78 K/UL — SIGNIFICANT CHANGE UP (ref 1.8–7.4)
IMM GRANULOCYTES NFR BLD AUTO: 0.4 % — SIGNIFICANT CHANGE UP (ref 0–1.5)
KETONES UR-MCNC: ABNORMAL
LEUKOCYTE ESTERASE UR-ACNC: NEGATIVE — SIGNIFICANT CHANGE UP
LYMPHOCYTES # BLD AUTO: 1.63 K/UL — SIGNIFICANT CHANGE UP (ref 1–3.3)
LYMPHOCYTES # BLD AUTO: 20.3 % — SIGNIFICANT CHANGE UP (ref 13–44)
MAGNESIUM SERPL-MCNC: 1.7 MG/DL — SIGNIFICANT CHANGE UP (ref 1.6–2.6)
MCHC RBC-ENTMCNC: 32.2 PG — SIGNIFICANT CHANGE UP (ref 27–34)
MCHC RBC-ENTMCNC: 35 GM/DL — SIGNIFICANT CHANGE UP (ref 32–36)
MCV RBC AUTO: 92 FL — SIGNIFICANT CHANGE UP (ref 80–100)
MONOCYTES # BLD AUTO: 0.52 K/UL — SIGNIFICANT CHANGE UP (ref 0–0.9)
MONOCYTES NFR BLD AUTO: 6.5 % — SIGNIFICANT CHANGE UP (ref 2–14)
NEUTROPHILS # BLD AUTO: 5.78 K/UL — SIGNIFICANT CHANGE UP (ref 1.8–7.4)
NEUTROPHILS NFR BLD AUTO: 72.1 % — SIGNIFICANT CHANGE UP (ref 43–77)
NITRITE UR-MCNC: NEGATIVE — SIGNIFICANT CHANGE UP
NRBC # BLD: 0 /100 WBCS — SIGNIFICANT CHANGE UP (ref 0–0)
NRBC # FLD: 0 K/UL — SIGNIFICANT CHANGE UP (ref 0–0)
PH UR: 7 — SIGNIFICANT CHANGE UP (ref 5–8)
PHOSPHATE SERPL-MCNC: 2.9 MG/DL — SIGNIFICANT CHANGE UP (ref 2.5–4.5)
PLATELET # BLD AUTO: 173 K/UL — SIGNIFICANT CHANGE UP (ref 150–400)
POTASSIUM SERPL-MCNC: 3.1 MMOL/L — LOW (ref 3.5–5.3)
POTASSIUM SERPL-SCNC: 3.1 MMOL/L — LOW (ref 3.5–5.3)
PROT SERPL-MCNC: 5.6 G/DL — LOW (ref 6–8.3)
PROT UR-MCNC: ABNORMAL
RBC # BLD: 4.01 M/UL — SIGNIFICANT CHANGE UP (ref 3.8–5.2)
RBC # FLD: 12.6 % — SIGNIFICANT CHANGE UP (ref 10.3–14.5)
RBC CASTS # UR COMP ASSIST: 2 /HPF — SIGNIFICANT CHANGE UP (ref 0–4)
SODIUM SERPL-SCNC: 136 MMOL/L — SIGNIFICANT CHANGE UP (ref 135–145)
SP GR SPEC: >1.05 (ref 1.01–1.05)
UROBILINOGEN FLD QL: SIGNIFICANT CHANGE UP
WBC # BLD: 8.02 K/UL — SIGNIFICANT CHANGE UP (ref 3.8–10.5)
WBC # FLD AUTO: 8.02 K/UL — SIGNIFICANT CHANGE UP (ref 3.8–10.5)
WBC UR QL: 3 /HPF — SIGNIFICANT CHANGE UP (ref 0–5)

## 2022-09-09 PROCEDURE — 99291 CRITICAL CARE FIRST HOUR: CPT

## 2022-09-09 PROCEDURE — 99223 1ST HOSP IP/OBS HIGH 75: CPT

## 2022-09-09 RX ORDER — LORATADINE 10 MG/1
10 TABLET ORAL ONCE
Refills: 0 | Status: DISCONTINUED | OUTPATIENT
Start: 2022-09-09 | End: 2022-09-09

## 2022-09-09 RX ORDER — POTASSIUM CHLORIDE 20 MEQ
40 PACKET (EA) ORAL EVERY 4 HOURS
Refills: 0 | Status: COMPLETED | OUTPATIENT
Start: 2022-09-09 | End: 2022-09-09

## 2022-09-09 RX ORDER — ACETAMINOPHEN 500 MG
650 TABLET ORAL EVERY 6 HOURS
Refills: 0 | Status: DISCONTINUED | OUTPATIENT
Start: 2022-09-09 | End: 2022-09-10

## 2022-09-09 RX ORDER — ACETAMINOPHEN 500 MG
650 TABLET ORAL ONCE
Refills: 0 | Status: COMPLETED | OUTPATIENT
Start: 2022-09-09 | End: 2022-09-09

## 2022-09-09 RX ORDER — POTASSIUM CHLORIDE 20 MEQ
40 PACKET (EA) ORAL EVERY 4 HOURS
Refills: 0 | Status: DISCONTINUED | OUTPATIENT
Start: 2022-09-09 | End: 2022-09-09

## 2022-09-09 RX ORDER — CHLORHEXIDINE GLUCONATE 213 G/1000ML
1 SOLUTION TOPICAL DAILY
Refills: 0 | Status: DISCONTINUED | OUTPATIENT
Start: 2022-09-09 | End: 2022-09-10

## 2022-09-09 RX ORDER — POTASSIUM CHLORIDE 20 MEQ
20 PACKET (EA) ORAL
Refills: 0 | Status: DISCONTINUED | OUTPATIENT
Start: 2022-09-09 | End: 2022-09-09

## 2022-09-09 RX ORDER — CALCIUM GLUCONATE 100 MG/ML
2 VIAL (ML) INTRAVENOUS ONCE
Refills: 0 | Status: COMPLETED | OUTPATIENT
Start: 2022-09-09 | End: 2022-09-09

## 2022-09-09 RX ORDER — LANOLIN ALCOHOL/MO/W.PET/CERES
3 CREAM (GRAM) TOPICAL AT BEDTIME
Refills: 0 | Status: DISCONTINUED | OUTPATIENT
Start: 2022-09-09 | End: 2022-09-10

## 2022-09-09 RX ORDER — MAGNESIUM SULFATE 500 MG/ML
2 VIAL (ML) INJECTION ONCE
Refills: 0 | Status: COMPLETED | OUTPATIENT
Start: 2022-09-09 | End: 2022-09-09

## 2022-09-09 RX ORDER — POTASSIUM CHLORIDE 20 MEQ
10 PACKET (EA) ORAL
Refills: 0 | Status: DISCONTINUED | OUTPATIENT
Start: 2022-09-09 | End: 2022-09-09

## 2022-09-09 RX ADMIN — Medication 650 MILLIGRAM(S): at 11:40

## 2022-09-09 RX ADMIN — Medication 25 GRAM(S): at 10:00

## 2022-09-09 RX ADMIN — Medication 650 MILLIGRAM(S): at 19:51

## 2022-09-09 RX ADMIN — CHLORHEXIDINE GLUCONATE 1 APPLICATION(S): 213 SOLUTION TOPICAL at 12:04

## 2022-09-09 RX ADMIN — Medication 650 MILLIGRAM(S): at 20:30

## 2022-09-09 RX ADMIN — Medication 40 MILLIEQUIVALENT(S): at 19:53

## 2022-09-09 RX ADMIN — Medication 40 MILLIEQUIVALENT(S): at 11:37

## 2022-09-09 RX ADMIN — Medication 200 GRAM(S): at 11:38

## 2022-09-09 RX ADMIN — Medication 650 MILLIGRAM(S): at 11:10

## 2022-09-09 RX ADMIN — Medication 40 MILLIEQUIVALENT(S): at 15:46

## 2022-09-09 RX ADMIN — SODIUM CHLORIDE 75 MILLILITER(S): 9 INJECTION INTRAMUSCULAR; INTRAVENOUS; SUBCUTANEOUS at 09:11

## 2022-09-09 RX ADMIN — Medication 575 MILLIGRAM(S): at 00:00

## 2022-09-09 RX ADMIN — Medication 75 MICROGRAM(S): at 06:21

## 2022-09-09 NOTE — DIETITIAN INITIAL EVALUATION ADULT - REASON FOR ADMISSION
Nontraumatic intracerebral hemorrhage  76F PMH hypothyroidism, Celiac disease, allergies, dementia (B/l aaox2), 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

## 2022-09-09 NOTE — DIETITIAN INITIAL EVALUATION ADULT - PERTINENT MEDS FT
MEDICATIONS  (STANDING):  chlorhexidine 2% Cloths 1 Application(s) Topical daily  influenza  Vaccine (HIGH DOSE) 0.7 milliLiter(s) IntraMuscular once  levothyroxine 75 MICROGram(s) Oral daily  potassium chloride    Tablet ER 40 milliEquivalent(s) Oral every 4 hours    MEDICATIONS  (PRN):  acetaminophen     Tablet .. 650 milliGRAM(s) Oral every 6 hours PRN Moderate Pain (4 - 6)  fexofenadine 180 milliGRAM(s) 1 Tablet(s) Oral two times a day PRN Hives

## 2022-09-09 NOTE — DIETITIAN INITIAL EVALUATION ADULT - ADD RECOMMEND
1. Regular diet, gluten and dairy free.  2. Trial Ensure Plant Based 1x daily.   3. Daily multivitamin

## 2022-09-09 NOTE — PROGRESS NOTE ADULT - SUBJECTIVE AND OBJECTIVE BOX
MICU PROGRESS NOTE    INTERVAL HPI/OVERNIGHT EVENTS:  The patient was receiving fluids. LR was switched to NS. 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 Today the patient is AAO X3. The patient states that she is having HA and neck pain but is otherwise okay. The patient denies vision loss (on exam patient can not see out of left peripherary) The patient states she is otherwise fine and would like to eat.     OBJECTIVE:    VITAL SIGNS:  ICU Vital Signs Last 24 Hrs  T(C): 37.1 (09 Sep 2022 08:00), Max: 38.1 (08 Sep 2022 14:29)  T(F): 98.7 (09 Sep 2022 08:00), Max: 100.6 (08 Sep 2022 14:29)  HR: 79 (09 Sep 2022 11:00) (60 - 88)  BP: 114/57 (09 Sep 2022 11:00) (102/73 - 163/70)  BP(mean): 74 (09 Sep 2022 11:00) (71 - 97)  ABP: --  ABP(mean): --  RR: 21 (09 Sep 2022 11:00) (13 - 116)  SpO2: 100% (09 Sep 2022 11:00) (95% - 100%)    O2 Parameters below as of 09 Sep 2022 11:00  Patient On (Oxygen Delivery Method): room air            VENTS:      I&O:    09-08 @ 07:01  -  09-09 @ 07:00  --------------------------------------------------------  IN: 980 mL / OUT: 730 mL / NET: 250 mL    09-09 @ 07:01 - 09-09 @ 12:25  --------------------------------------------------------  IN: 400 mL / OUT: 0 mL / NET: 400 mL        PHYSICAL EXAM:  GENERAL: NAD, conversant  CHEST/LUNG: Clear to auscultation bilaterally; No crackles, rhonchi, wheezing, or rubs  HEART: Regular rate and rhythm; No murmurs, rubs, or gallops  ABDOMEN: Soft, Nontender, Nondistended; Bowel sounds present  EXTREMITIES:  2+ Peripheral Pulses, No clubbing, cyanosis, or edema  SKIN: No rashes or lesions  NERVOUS SYSTEM:  Alert & Oriented, Good concentration      LINES:                                       MEDICATIONS:  MEDICATIONS  (STANDING):  chlorhexidine 2% Cloths 1 Application(s) Topical daily  influenza  Vaccine (HIGH DOSE) 0.7 milliLiter(s) IntraMuscular once  levothyroxine 75 MICROGram(s) Oral daily  potassium chloride    Tablet ER 40 milliEquivalent(s) Oral every 4 hours    MEDICATIONS  (PRN):  acetaminophen     Tablet .. 650 milliGRAM(s) Oral every 6 hours PRN Moderate Pain (4 - 6)  fexofenadine 180 milliGRAM(s) 1 Tablet(s) Oral two times a day PRN Hives      ALLERGIES:  Allergies    &quot;cholesterol medications&quot;  gets &quot;jaundiced&quot; (Unknown)  dairy products (Unknown)  Gluten (Diarrhea)  Grapes (Hives)  ibuprofen (Other)  Pineapple (Hives)    Intolerances        LABS:                        12.9   8.02  )-----------( 173      ( 09 Sep 2022 03:25 )             36.9     09-09    136  |  106  |  8   ----------------------------<  77  3.1<L>   |  18<L>  |  0.54    Ca    7.2<L>      09 Sep 2022 03:25  Phos  2.9     09-09  Mg     1.70     09-09    TPro  5.6<L>  /  Alb  3.3  /  TBili  0.7  /  DBili  x   /  AST  21  /  ALT  20  /  AlkPhos  52  09-09    PT/INR - ( 08 Sep 2022 16:56 )   PT: 12.4 sec;   INR: 1.07 ratio         PTT - ( 09 Sep 2022 03:25 )  PTT:25.5 sec  Urinalysis Basic - ( 09 Sep 2022 06:50 )    Color: Yellow / Appearance: Clear / SG: >1.050 / pH: x  Gluc: x / Ketone: Small  / Bili: Negative / Urobili: <2 mg/dL   Blood: x / Protein: Trace / Nitrite: Negative   Leuk Esterase: Negative / RBC: 2 /HPF / WBC 3 /HPF   Sq Epi: x / Non Sq Epi: 0 /HPF / Bacteria: Negative        CAPILLARY BLOOD GLUCOSE  87 (08 Sep 2022 16:57)      POCT Blood Glucose.: 87 mg/dL (08 Sep 2022 16:49)      RADIOLOGY & ADDITIONAL TESTS: Reviewed. MICU PROGRESS NOTE    INTERVAL HPI/OVERNIGHT EVENTS:  The patient was receiving fluids. LR was switched to NS. 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 Today the patient is AAO X3. The patient states that she is having HA and neck pain but is otherwise okay. The patient denies vision loss (on exam patient can not see out of left peripherary) The patient states she is otherwise fine and would like to eat.     OBJECTIVE:    VITAL SIGNS:  ICU Vital Signs Last 24 Hrs  T(C): 37.1 (09 Sep 2022 08:00), Max: 38.1 (08 Sep 2022 14:29)  T(F): 98.7 (09 Sep 2022 08:00), Max: 100.6 (08 Sep 2022 14:29)  HR: 79 (09 Sep 2022 11:00) (60 - 88)  BP: 114/57 (09 Sep 2022 11:00) (102/73 - 163/70)  BP(mean): 74 (09 Sep 2022 11:00) (71 - 97)  ABP: --  ABP(mean): --  RR: 21 (09 Sep 2022 11:00) (13 - 116)  SpO2: 100% (09 Sep 2022 11:00) (95% - 100%)    O2 Parameters below as of 09 Sep 2022 11:00  Patient On (Oxygen Delivery Method): room air            VENTS:      I&O:    09-08 @ 07:01  -  09-09 @ 07:00  --------------------------------------------------------  IN: 980 mL / OUT: 730 mL / NET: 250 mL    09-09 @ 07:01 - 09-09 @ 12:25  --------------------------------------------------------  IN: 400 mL / OUT: 0 mL / NET: 400 mL        PHYSICAL EXAM:  GENERAL: NAD, conversant  CHEST/LUNG: Clear to auscultation bilaterally; No crackles, rhonchi, wheezing, or rubs  HEART: Regular rate and rhythm; No murmurs, rubs, or gallops  ABDOMEN: Soft, Nontender, Nondistended; Bowel sounds present  EXTREMITIES:  2+ Peripheral Pulses, No clubbing, cyanosis, or edema  SKIN: No rashes or lesions  NERVOUS SYSTEM:  Alert & Oriented, Good concentration, one neuro exam the patient could not see out the periphery of the L side. On tracking and movement of the eye the patient stopped following when tracking to the left and was not able to move her eyes to the left completely. Otherwise neuro physical exam was unremarkable.                                   MEDICATIONS:  MEDICATIONS  (STANDING):  chlorhexidine 2% Cloths 1 Application(s) Topical daily  influenza  Vaccine (HIGH DOSE) 0.7 milliLiter(s) IntraMuscular once  levothyroxine 75 MICROGram(s) Oral daily  potassium chloride    Tablet ER 40 milliEquivalent(s) Oral every 4 hours    MEDICATIONS  (PRN):  acetaminophen     Tablet .. 650 milliGRAM(s) Oral every 6 hours PRN Moderate Pain (4 - 6)  fexofenadine 180 milliGRAM(s) 1 Tablet(s) Oral two times a day PRN Hives      ALLERGIES:  Allergies    &quot;cholesterol medications&quot;  gets &quot;jaundiced&quot; (Unknown)  dairy products (Unknown)  Gluten (Diarrhea)  Grapes (Hives)  ibuprofen (Other)  Pineapple (Hives)    Intolerances        LABS:                        12.9   8.02  )-----------( 173      ( 09 Sep 2022 03:25 )             36.9     09-09    136  |  106  |  8   ----------------------------<  77  3.1<L>   |  18<L>  |  0.54    Ca    7.2<L>      09 Sep 2022 03:25  Phos  2.9     09-09  Mg     1.70     09-09    TPro  5.6<L>  /  Alb  3.3  /  TBili  0.7  /  DBili  x   /  AST  21  /  ALT  20  /  AlkPhos  52  09-09    PT/INR - ( 08 Sep 2022 16:56 )   PT: 12.4 sec;   INR: 1.07 ratio         PTT - ( 09 Sep 2022 03:25 )  PTT:25.5 sec  Urinalysis Basic - ( 09 Sep 2022 06:50 )    Color: Yellow / Appearance: Clear / SG: >1.050 / pH: x  Gluc: x / Ketone: Small  / Bili: Negative / Urobili: <2 mg/dL   Blood: x / Protein: Trace / Nitrite: Negative   Leuk Esterase: Negative / RBC: 2 /HPF / WBC 3 /HPF   Sq Epi: x / Non Sq Epi: 0 /HPF / Bacteria: Negative        CAPILLARY BLOOD GLUCOSE  87 (08 Sep 2022 16:57)      POCT Blood Glucose.: 87 mg/dL (08 Sep 2022 16:49)      RADIOLOGY & ADDITIONAL TESTS: Reviewed.

## 2022-09-09 NOTE — DIETITIAN NUTRITION RISK NOTIFICATION - TREATMENT: THE FOLLOWING DIET HAS BEEN RECOMMENDED
Diet, Regular:   Lactose Restricted (Milk Sugar Intoler.)  Milk Protein Restr(Milk Protein Allergy)  Gluten-Gliadin Restricted  No Dairy (09-09-22 @ 10:24) [Active]

## 2022-09-09 NOTE — DIETITIAN INITIAL EVALUATION ADULT - PERTINENT LABORATORY DATA
09-09    136  |  106  |  8   ----------------------------<  77  3.1<L>   |  18<L>  |  0.54    Ca    7.2<L>      09 Sep 2022 03:25  Phos  2.9     09-09  Mg     1.70     09-09    TPro  5.6<L>  /  Alb  3.3  /  TBili  0.7  /  DBili  x   /  AST  21  /  ALT  20  /  AlkPhos  52  09-09  POCT Blood Glucose.: 87 mg/dL (09-08-22 @ 16:49)  A1C with Estimated Average Glucose Result: 5.1 % (09-08-22 @ 16:56)

## 2022-09-09 NOTE — DIETITIAN INITIAL EVALUATION ADULT - ORAL INTAKE PTA/DIET HISTORY
follows gluten free, dairy free diet strictly. In past 6 months, pt  with decrease in PO intake due to dementia/forgetting to eat/early satiety.

## 2022-09-09 NOTE — CHART NOTE - NSCHARTNOTEFT_GEN_A_CORE
9/8: IMPRESSION: Normal CTA of the neck. There is slightly increased   vascularity in the right occipital lobe medial to the parenchymal   hemorrhage suspicious for an anterior venous malformation.    discussed above finding with neurosurgeon Dr. cisse   P:  - rpt CTA h in 1 week on Thursday 9/15/22 based on results of rpt CTA will decide for cerebral angiogram.    d/w attending

## 2022-09-09 NOTE — CHART NOTE - NSCHARTNOTEFT_GEN_A_CORE
MICU Transfer Note  ---------------------------    Transfer from: MICU  Transfer to:  (  ) Medicine    ( X ) Telemetry    (  ) RCU    (  ) Palliative    (  ) Stroke Unit    (  ) _______________  Accepting Physician: Dr. 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 Initial Hx from son -Sinan and daughter - Samantha at bedside. Pt has dementia and poor memory for past 6-7 months, often asks repetitive questions. At baseline very active, walks 3 miles a day, converses normally, just poor memory. Baseline aaox2. Monday night, pt c/o HA. Wed pt noted to be more "sleepy", but family thought she may have just been tired from going to sleep late the night prior. Thursday, she was sleeping later than usual. When daughter went to check on her, she was standing in front of patient on patient's L side but her mother was unable to see her. She also noted an abnormal gait. This prompted her to bring her to the ED. She also noted a bruise on the L forehead of patient and L upper thigh, thinks she may have fell in the shower possibly. Daughter notes pt tends to deny having symptoms so as not to worry the family. In ED Pt aaox2, talkative and pleasant. Poor memory during conversation. Denied any HA, CP, SOB, abd pain, n/v/d. She was unable to specificy if she has visual changes. Denied any focal weakness, numbness tingling. Reported tenderness at location of L forehead. Pt is not on any blood thinners, denies any OTC ASA or NSAIDs. Pt unsure if she had any head trauma.    CTH revealed "A 4.1 x 2.1 x 3.9 cm focus of hyperdensity within the right occipital lobe with surrounding edema, consistent with acute hemorrhage. There is extension of hemorrhage into the right lateral ventricle occipital horn." CTA with " IMPRESSION: Normal CTA of the neck. There is slightly increased vascularity in the right occipital lobe medial to the parenchymal hemorrhage suspicious for an anterior venous malformation" The patient was admitted to the MICU for close monitoring and q1hr neurology checks.     Today on evaluation the patient denies being unable to see of her left eye and endorses neck and head pain. The patient was aao X2 and was mildly agitated. She frequently repeated questions. On physical exam the patient was unable to tract finger with eye laterally to left fully and was unable to see out of the left eye periphery. Otherwise neuro exam was normal. The patient BP goal is to be below 160/90 and that has been maintained. The patient received potassium for hypokalemia. Allegra for allergies and levothyroxine for hypothyroidism. CT head showed no progression of IPH. Pending MRI brain and TTE. Repeat CT scan should be done in 1 week.      To-Do:    [ ] MRI head   [ ] BP control <160/90  [ ] Na control 135-150  [ ] Neuro check q4hrs  [ ] CT head 1 week    OBJECTIVE --  Vital Signs Last 24 Hrs  T(C): 37.1 (09 Sep 2022 08:00), Max: 38.1 (08 Sep 2022 14:29)  T(F): 98.7 (09 Sep 2022 08:00), Max: 100.6 (08 Sep 2022 14:29)  HR: 79 (09 Sep 2022 11:00) (60 - 88)  BP: 114/57 (09 Sep 2022 11:00) (102/73 - 163/70)  BP(mean): 74 (09 Sep 2022 11:00) (71 - 97)  RR: 21 (09 Sep 2022 11:00) (13 - 116)  SpO2: 100% (09 Sep 2022 11:00) (95% - 100%)    Parameters below as of 09 Sep 2022 11:00  Patient On (Oxygen Delivery Method): room air        I&O's Summary    08 Sep 2022 07:01  -  09 Sep 2022 07:00  --------------------------------------------------------  IN: 980 mL / OUT: 730 mL / NET: 250 mL    09 Sep 2022 07:01  -  09 Sep 2022 13:26  --------------------------------------------------------  IN: 400 mL / OUT: 0 mL / NET: 400 mL        MEDICATIONS  (STANDING):  chlorhexidine 2% Cloths 1 Application(s) Topical daily  influenza  Vaccine (HIGH DOSE) 0.7 milliLiter(s) IntraMuscular once  levothyroxine 75 MICROGram(s) Oral daily  potassium chloride    Tablet ER 40 milliEquivalent(s) Oral every 4 hours    MEDICATIONS  (PRN):  acetaminophen     Tablet .. 650 milliGRAM(s) Oral every 6 hours PRN Moderate Pain (4 - 6)  fexofenadine 180 milliGRAM(s) 1 Tablet(s) Oral two times a day PRN Hives        LABS                                            12.9                  Neurophils% (auto):   72.1   (09-09 @ 03:25):    8.02 )-----------(173          Lymphocytes% (auto):  20.3                                          36.9                   Eosinphils% (auto):   0.7      Manual%: Neutrophils x    ; Lymphocytes x    ; Eosinophils x    ; Bands%: x    ; Blasts x                                    136    |  106    |  8                   Calcium: 7.2   / iCa: x      (09-09 @ 03:25)    ----------------------------<  77        Magnesium: 1.70                             3.1     |  18     |  0.54             Phosphorous: 2.9      TPro  5.6    /  Alb  3.3    /  TBili  0.7    /  DBili  x      /  AST  21     /  ALT  20     /  AlkPhos  52     09 Sep 2022 03:25    ( 09-09 @ 03:25 )   PT: x    ;   INR: x      aPTT: 25.5 sec          ASSESSMENT & PLAN:   76F PMH hypothyroidism, Celiac disease, allergies, dementia (B/l aaox2), 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      #Neuro:  ##R occipital IPH  CTH 9/8 @5PM:  1 x 2.1 x 3.9 cm hemorrhage in right occipital lobe.  CTA 9/9: c/f anterior venous malformation  P/w L hemianopsia  4 hour repeat CTH - ordered and confirmed with CT dept - showed no progression of IPH   CT venogram - to eval venous malformation , to be done with 4 hour repeat CTH - noted a right occipital lobe hematoma with surrounding vasogenic edema without significant change in size or appearance from the prior exam.   neuro check q2h  likely 2/2 trauma +/- venous malformation  neurosx: no acute intervention per phone   1 week CT head repeat and q4h neurology checks   neurology: MR brain, TTE ordered.        #Cardiovascular:  -BP  138/58   Strict BP control goal <160/90 w/ Cardene drip if needed    #Pulmonary:  -CXR clear lungs  -on RA, no active issue    #FEN/GI:  -hx of celiac dz  -patient passed dysphagia study, can begin gluten free, dairy free diet    #Renal:  -D/C fluids     #:  -pending UCx    #ID:  ##Fever  -T 100.6, WBC 13, may be reactive from IPH, no localizing symptoms  - No further fevers, WBCs downtrending now 8   CXR clear lungs  - f/u bcx sent by  ED  - UCx ordered  - UA - No signs of infxn   Monitor off abx    #Heme:  -No chemical AC due to bleed  SCDs    #Endo:  -Hx of grave's dz, s/p radioactive iodine, now hypothyroid on synthroid  c/w home synthroid    #Ethics:  -2 children Sinan [phone]  Samantha [phone]  full code      For Follow-Up: MICU Transfer Note  ---------------------------    Transfer from: MICU  Transfer to:  (  ) Medicine    ( X ) Telemetry    (  ) RCU    (  ) Palliative    (  ) Stroke Unit    (  ) _______________  Accepting Physician: Dr. 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 Initial Hx from son -Sinan and daughter - Samantha at bedside. Pt has dementia and poor memory for past 6-7 months, often asks repetitive questions. At baseline very active, walks 3 miles a day, converses normally, just poor memory. Baseline aaox2. Monday night, pt c/o HA. Wed pt noted to be more "sleepy", but family thought she may have just been tired from going to sleep late the night prior. Thursday, she was sleeping later than usual. When daughter went to check on her, she was standing in front of patient on patient's L side but her mother was unable to see her. She also noted an abnormal gait. This prompted her to bring her to the ED. She also noted a bruise on the L forehead of patient and L upper thigh, thinks she may have fell in the shower possibly. Daughter notes pt tends to deny having symptoms so as not to worry the family. In ED Pt aaox2, talkative and pleasant. Poor memory during conversation. Denied any HA, CP, SOB, abd pain, n/v/d. She was unable to specificy if she has visual changes. Denied any focal weakness, numbness tingling. Reported tenderness at location of L forehead. Pt is not on any blood thinners, denies any OTC ASA or NSAIDs. Pt unsure if she had any head trauma.    CTH revealed "A 4.1 x 2.1 x 3.9 cm focus of hyperdensity within the right occipital lobe with surrounding edema, consistent with acute hemorrhage. There is extension of hemorrhage into the right lateral ventricle occipital horn." CTA with " IMPRESSION: Normal CTA of the neck. There is slightly increased vascularity in the right occipital lobe medial to the parenchymal hemorrhage suspicious for an anterior venous malformation" The patient was admitted to the MICU for close monitoring and q1hr neurology checks.     Today on evaluation the patient denies being unable to see of her left eye and endorses neck and head pain. The patient was aao X2 and was mildly agitated. She frequently repeated questions. On physical exam the patient was unable to tract finger with eye laterally to left fully and was unable to see out of the left eye periphery. Otherwise neuro exam was normal. The patient BP goal is to be below 160/90 and that has been maintained. The patient received potassium for hypokalemia. Allegra for allergies and levothyroxine for hypothyroidism. CT head showed no progression of IPH. Pending MRI brain and TTE. Per neurosurgery, repeat CT scan should be done in 1 week (9/15).      To-Do:    [ ] MRI head   [ ] BP control <160/90  [ ] Na control 135-150  [ ] Neuro check q4hrs  [ ] CT head 9/15    OBJECTIVE --  Vital Signs Last 24 Hrs  T(C): 37.1 (09 Sep 2022 08:00), Max: 38.1 (08 Sep 2022 14:29)  T(F): 98.7 (09 Sep 2022 08:00), Max: 100.6 (08 Sep 2022 14:29)  HR: 79 (09 Sep 2022 11:00) (60 - 88)  BP: 114/57 (09 Sep 2022 11:00) (102/73 - 163/70)  BP(mean): 74 (09 Sep 2022 11:00) (71 - 97)  RR: 21 (09 Sep 2022 11:00) (13 - 116)  SpO2: 100% (09 Sep 2022 11:00) (95% - 100%)    Parameters below as of 09 Sep 2022 11:00  Patient On (Oxygen Delivery Method): room air        I&O's Summary    08 Sep 2022 07:01  -  09 Sep 2022 07:00  --------------------------------------------------------  IN: 980 mL / OUT: 730 mL / NET: 250 mL    09 Sep 2022 07:01  -  09 Sep 2022 13:26  --------------------------------------------------------  IN: 400 mL / OUT: 0 mL / NET: 400 mL        MEDICATIONS  (STANDING):  chlorhexidine 2% Cloths 1 Application(s) Topical daily  influenza  Vaccine (HIGH DOSE) 0.7 milliLiter(s) IntraMuscular once  levothyroxine 75 MICROGram(s) Oral daily  potassium chloride    Tablet ER 40 milliEquivalent(s) Oral every 4 hours    MEDICATIONS  (PRN):  acetaminophen     Tablet .. 650 milliGRAM(s) Oral every 6 hours PRN Moderate Pain (4 - 6)  fexofenadine 180 milliGRAM(s) 1 Tablet(s) Oral two times a day PRN Hives        LABS                                            12.9                  Neurophils% (auto):   72.1   (09-09 @ 03:25):    8.02 )-----------(173          Lymphocytes% (auto):  20.3                                          36.9                   Eosinphils% (auto):   0.7      Manual%: Neutrophils x    ; Lymphocytes x    ; Eosinophils x    ; Bands%: x    ; Blasts x                                    136    |  106    |  8                   Calcium: 7.2   / iCa: x      (09-09 @ 03:25)    ----------------------------<  77        Magnesium: 1.70                             3.1     |  18     |  0.54             Phosphorous: 2.9      TPro  5.6    /  Alb  3.3    /  TBili  0.7    /  DBili  x      /  AST  21     /  ALT  20     /  AlkPhos  52     09 Sep 2022 03:25    ( 09-09 @ 03:25 )   PT: x    ;   INR: x      aPTT: 25.5 sec          ASSESSMENT & PLAN:   76F PMH hypothyroidism, Celiac disease, allergies, dementia (B/l aaox2), 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      #[facility]:  [facility]  1 x 2.1 x 3.9 cm hemorrhage in right occipital lobe.  CTA 9/9: c/f anterior venous malformation  P/w L hemianopsia  4 hour repeat CTH - ordered and confirmed with CT dept - showed no progression of IPH   CT venogram - to eval venous malformation , to be done with 4 hour repeat CTH - noted a right occipital lobe hematoma with surrounding vasogenic edema without significant change in size or appearance from the prior exam.   neuro check q2h  likely 2/2 trauma +/- venous malformation  neurosx: no acute intervention per phone   1 week CT head repeat and q4h neurology checks   neurology: MR brain, TTE ordered.        #Cardiovascular:  -BP  138/58   Strict BP control goal <160/90 w/ Cardene drip if needed    #Pulmonary:  -CXR clear lungs  -on RA, no active issue    #FEN/GI:  -hx of celiac dz  -patient passed dysphagia study, can begin gluten free, dairy free diet    #Renal:  -D/C fluids     #:  -pending UCx    #ID:  ##Fever  -T 100.6, WBC 13, may be reactive from IPH, no localizing symptoms  - No further fevers, WBCs downtrending now 8   CXR clear lungs  - f/u bcx sent by  ED  - UCx ordered  - UA - No signs of infxn   Monitor off abx    #Heme:  -No chemical AC due to bleed  SCDs    #Endo:  -Hx of grave's dz, s/p radioactive iodine, now hypothyroid on synthroid  c/w home synthroid    #Ethics:  -2 children Sinan [phone]  Samantha [phone]  full code      For Follow-Up:

## 2022-09-09 NOTE — DIETITIAN INITIAL EVALUATION ADULT - PERSON TAUGHT/METHOD
importance of optimizing PO to prevent wt loss during hospitalization./verbal instruction/patient instructed

## 2022-09-09 NOTE — DIETITIAN INITIAL EVALUATION ADULT - NS FNS DIET ORDER
Diet, Regular:   Lactose Restricted (Milk Sugar Intoler.)  Milk Protein Restr(Milk Protein Allergy)  Gluten-Gliadin Restricted  No Dairy (09-09-22 @ 10:24)

## 2022-09-09 NOTE — DIETITIAN INITIAL EVALUATION ADULT - OTHER INFO
A&Ox2-3. Pt's  family at bedside. Food preferences obtained. Amenable to trialing Ensure Plant Based. No reported GI issues (nausea/vomiting/diarrhea/constipation.) Denies any chewing or swallowing difficulties at this time. Family reports gradual wt loss in past 6 months (98 pounds to 84 pounds).

## 2022-09-09 NOTE — PROGRESS NOTE ADULT - ASSESSMENT
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      #Neuro:  ##R occipital IPH  CTH 9/8 @5PM:  1 x 2.1 x 3.9 cm hemorrhage in right occipital lobe.  CTA 9/9: c/f anterior venous malformation  P/w L hemianopsia  4 hour repeat CTH - ordered and confirmed with CT dept - showed no prgression of IPH   CT venogram - to eval venous malformation , to be done with 4 hour repeat CTH - noted a right occipital lobe hematoma with surrounding vasogenic edema without significant change in size or appearance from the prior exam.   neuro check q2h  likely 2/2 trauma +/- venous malformation  neurosx: no acute intervention per phone   Pending neurology recommendation on 24 hour CT repeat and q4h neurology checks   neurology: MR brain, TTE ordered.        #Cardiovascular:  -BP  138/58   Strict BP control goal <160/90 w/ Cardene drip if needed    #Pulmonary:  -CXR clear lungs  -on RA, no active issue    #FEN/GI:  -hx of celiac dz  -patient passed dysphagia study, can begin gluten free, dairy free diet    #Renal:  -D/C fluids     #:  -pending UCx    #ID:  ##Fever  -T 100.6, WBC 13, may be reactive from IPH, no localizing symptoms  - No further fevers, WBCs downtrending   CXR clear lungs  - f/u bcx sent by  ED  - UA - No signs of infxn   - UCx ordered  Monitor off abx    #Heme:  -No chemical AC due to bleed  SCDs    #Endo:  -Hx of grave's dz, s/p radioactive iodine, now hypothyroid on synthroid  c/w home synthroid    #Ethics:  -2 children Sinan [phone]  Samantha [phone]  full code 76F PMH hypothyroidism, Celiac disease, allergies, dementia (B/l aaox2), 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      #Neuro:  ##R occipital IPH  CTH 9/8 @5PM:  1 x 2.1 x 3.9 cm hemorrhage in right occipital lobe.  CTA 9/9: c/f anterior venous malformation  P/w L hemianopsia  4 hour repeat CTH - ordered and confirmed with CT dept - showed no prgression of IPH   CT venogram - to eval venous malformation , to be done with 4 hour repeat CTH - noted a right occipital lobe hematoma with surrounding vasogenic edema without significant change in size or appearance from the prior exam.   likely 2/2 trauma +/- venous malformation  neurosx: no acute intervention per phone   Pending neurology recommendation on 24 hour CT repeat and q4h neurology checks   neurology: MR brain, TTE ordered.        #Cardiovascular:  -BP  138/58   Strict BP control goal <160/90 w/ Cardene drip if needed    #Pulmonary:  -CXR clear lungs  -on RA, no active issue    #FEN/GI:  -hx of celiac dz  -patient passed dysphagia study, can begin gluten free, dairy free diet    #Renal:  -D/C fluids     #:  -pending UCx    #ID:  ##Fever  -T 100.6, WBC 13, may be reactive from IPH, no localizing symptoms  - No further fevers, WBCs downtrending   CXR clear lungs  - f/u bcx sent by  ED  - UA - No signs of infxn   - UCx ordered  Monitor off abx    #Heme:  -No chemical AC due to bleed  SCDs    #Endo:  -Hx of grave's dz, s/p radioactive iodine, now hypothyroid on synthroid  c/w home synthroid    #Ethics:  -2 children Sinan [phone]  Samantha [phone]  full code

## 2022-09-10 ENCOUNTER — TRANSCRIPTION ENCOUNTER (OUTPATIENT)
Age: 77
End: 2022-09-10

## 2022-09-10 LAB
ALBUMIN SERPL ELPH-MCNC: 4.1 G/DL — SIGNIFICANT CHANGE UP (ref 3.3–5)
ALP SERPL-CCNC: 69 U/L — SIGNIFICANT CHANGE UP (ref 40–120)
ALT FLD-CCNC: 22 U/L — SIGNIFICANT CHANGE UP (ref 4–33)
ANION GAP SERPL CALC-SCNC: 13 MMOL/L — SIGNIFICANT CHANGE UP (ref 7–14)
APTT BLD: 27.4 SEC — SIGNIFICANT CHANGE UP (ref 27–36.3)
AST SERPL-CCNC: 26 U/L — SIGNIFICANT CHANGE UP (ref 4–32)
BASOPHILS # BLD AUTO: 0.01 K/UL — SIGNIFICANT CHANGE UP (ref 0–0.2)
BASOPHILS NFR BLD AUTO: 0.1 % — SIGNIFICANT CHANGE UP (ref 0–2)
BILIRUB SERPL-MCNC: 0.7 MG/DL — SIGNIFICANT CHANGE UP (ref 0.2–1.2)
BUN SERPL-MCNC: 9 MG/DL — SIGNIFICANT CHANGE UP (ref 7–23)
CALCIUM SERPL-MCNC: 9.5 MG/DL — SIGNIFICANT CHANGE UP (ref 8.4–10.5)
CHLORIDE SERPL-SCNC: 106 MMOL/L — SIGNIFICANT CHANGE UP (ref 98–107)
CO2 SERPL-SCNC: 19 MMOL/L — LOW (ref 22–31)
CREAT SERPL-MCNC: 0.6 MG/DL — SIGNIFICANT CHANGE UP (ref 0.5–1.3)
EGFR: 94 ML/MIN/1.73M2 — SIGNIFICANT CHANGE UP
EOSINOPHIL # BLD AUTO: 0.04 K/UL — SIGNIFICANT CHANGE UP (ref 0–0.5)
EOSINOPHIL NFR BLD AUTO: 0.3 % — SIGNIFICANT CHANGE UP (ref 0–6)
GLUCOSE SERPL-MCNC: 112 MG/DL — HIGH (ref 70–99)
HCT VFR BLD CALC: 44 % — SIGNIFICANT CHANGE UP (ref 34.5–45)
HGB BLD-MCNC: 14.7 G/DL — SIGNIFICANT CHANGE UP (ref 11.5–15.5)
IANC: 10.7 K/UL — HIGH (ref 1.8–7.4)
IMM GRANULOCYTES NFR BLD AUTO: 0.4 % — SIGNIFICANT CHANGE UP (ref 0–1.5)
INR BLD: 1.1 RATIO — SIGNIFICANT CHANGE UP (ref 0.88–1.16)
LYMPHOCYTES # BLD AUTO: 1.18 K/UL — SIGNIFICANT CHANGE UP (ref 1–3.3)
LYMPHOCYTES # BLD AUTO: 9.5 % — LOW (ref 13–44)
MAGNESIUM SERPL-MCNC: 2.4 MG/DL — SIGNIFICANT CHANGE UP (ref 1.6–2.6)
MCHC RBC-ENTMCNC: 31.3 PG — SIGNIFICANT CHANGE UP (ref 27–34)
MCHC RBC-ENTMCNC: 33.4 GM/DL — SIGNIFICANT CHANGE UP (ref 32–36)
MCV RBC AUTO: 93.6 FL — SIGNIFICANT CHANGE UP (ref 80–100)
MONOCYTES # BLD AUTO: 0.48 K/UL — SIGNIFICANT CHANGE UP (ref 0–0.9)
MONOCYTES NFR BLD AUTO: 3.9 % — SIGNIFICANT CHANGE UP (ref 2–14)
NEUTROPHILS # BLD AUTO: 10.7 K/UL — HIGH (ref 1.8–7.4)
NEUTROPHILS NFR BLD AUTO: 85.8 % — HIGH (ref 43–77)
NRBC # BLD: 0 /100 WBCS — SIGNIFICANT CHANGE UP (ref 0–0)
NRBC # FLD: 0 K/UL — SIGNIFICANT CHANGE UP (ref 0–0)
PLATELET # BLD AUTO: 239 K/UL — SIGNIFICANT CHANGE UP (ref 150–400)
POTASSIUM SERPL-MCNC: 5.2 MMOL/L — SIGNIFICANT CHANGE UP (ref 3.5–5.3)
POTASSIUM SERPL-SCNC: 5.2 MMOL/L — SIGNIFICANT CHANGE UP (ref 3.5–5.3)
PROT SERPL-MCNC: 7.3 G/DL — SIGNIFICANT CHANGE UP (ref 6–8.3)
PROTHROM AB SERPL-ACNC: 12.8 SEC — SIGNIFICANT CHANGE UP (ref 10.5–13.4)
RBC # BLD: 4.7 M/UL — SIGNIFICANT CHANGE UP (ref 3.8–5.2)
RBC # FLD: 12.4 % — SIGNIFICANT CHANGE UP (ref 10.3–14.5)
SODIUM SERPL-SCNC: 138 MMOL/L — SIGNIFICANT CHANGE UP (ref 135–145)
WBC # BLD: 12.46 K/UL — HIGH (ref 3.8–10.5)
WBC # FLD AUTO: 12.46 K/UL — HIGH (ref 3.8–10.5)

## 2022-09-10 PROCEDURE — 99291 CRITICAL CARE FIRST HOUR: CPT

## 2022-09-10 RX ORDER — ONDANSETRON 8 MG/1
4 TABLET, FILM COATED ORAL EVERY 8 HOURS
Refills: 0 | Status: DISCONTINUED | OUTPATIENT
Start: 2022-09-10 | End: 2022-09-10

## 2022-09-10 RX ORDER — POTASSIUM CHLORIDE 20 MEQ
40 PACKET (EA) ORAL EVERY 4 HOURS
Refills: 0 | Status: DISCONTINUED | OUTPATIENT
Start: 2022-09-10 | End: 2022-09-10

## 2022-09-10 RX ORDER — LANOLIN ALCOHOL/MO/W.PET/CERES
3 CREAM (GRAM) TOPICAL AT BEDTIME
Refills: 0 | Status: DISCONTINUED | OUTPATIENT
Start: 2022-09-10 | End: 2022-09-16

## 2022-09-10 RX ORDER — LANOLIN ALCOHOL/MO/W.PET/CERES
3 CREAM (GRAM) TOPICAL AT BEDTIME
Refills: 0 | Status: DISCONTINUED | OUTPATIENT
Start: 2022-09-10 | End: 2022-09-10

## 2022-09-10 RX ORDER — ACETAMINOPHEN 500 MG
650 TABLET ORAL ONCE
Refills: 0 | Status: COMPLETED | OUTPATIENT
Start: 2022-09-10 | End: 2022-09-10

## 2022-09-10 RX ORDER — ACETAMINOPHEN 500 MG
650 TABLET ORAL EVERY 6 HOURS
Refills: 0 | Status: DISCONTINUED | OUTPATIENT
Start: 2022-09-10 | End: 2022-09-16

## 2022-09-10 RX ADMIN — Medication 650 MILLIGRAM(S): at 20:12

## 2022-09-10 RX ADMIN — Medication 650 MILLIGRAM(S): at 07:20

## 2022-09-10 RX ADMIN — Medication 650 MILLIGRAM(S): at 08:50

## 2022-09-10 RX ADMIN — Medication 650 MILLIGRAM(S): at 13:45

## 2022-09-10 RX ADMIN — Medication 75 MICROGRAM(S): at 06:56

## 2022-09-10 RX ADMIN — Medication 650 MILLIGRAM(S): at 20:45

## 2022-09-10 RX ADMIN — CHLORHEXIDINE GLUCONATE 1 APPLICATION(S): 213 SOLUTION TOPICAL at 12:38

## 2022-09-10 RX ADMIN — Medication 650 MILLIGRAM(S): at 12:38

## 2022-09-10 NOTE — PHYSICAL THERAPY INITIAL EVALUATION ADULT - DIAGNOSIS, PT EVAL
Upon evaluation, pt presents with impairments in balance and gait. Pt would benefit from skilled PT services in the acute care setting to address impairments to facilitate return to prior level of function.

## 2022-09-10 NOTE — PROGRESS NOTE ADULT - NUTRITIONAL ASSESSMENT
This patient has been assessed with a concern for Malnutrition and has been determined to have a diagnosis/diagnoses of Severe protein-calorie malnutrition and Underweight (BMI < 19).    This patient is being managed with:   Diet Regular-  Lactose Restricted (Milk Sugar Intoler.)  Milk Protein Restr(Milk Protein Allergy)  Gluten-Gliadin Restricted  No Dairy  Entered: Sep  9 2022 10:23AM

## 2022-09-10 NOTE — PHYSICAL THERAPY INITIAL EVALUATION ADULT - ADDITIONAL COMMENTS
Pt lives in a private home with her son; there are ~3 steps to enter and 1 flight to bedroom. Ambulates ~3 miles/day.

## 2022-09-10 NOTE — PROGRESS NOTE ADULT - SUBJECTIVE AND OBJECTIVE BOX
INTERVAL HPI/OVERNIGHT EVENTS:    SUBJECTIVE: Patient seen and examined at bedside. Intubated, sedated, ROS cannot be obtained.       VITAL SIGNS:  ICU Vital Signs Last 24 Hrs  T(C): 36.4 (10 Sep 2022 00:00), Max: 37.2 (09 Sep 2022 20:00)  T(F): 97.5 (10 Sep 2022 00:00), Max: 98.9 (09 Sep 2022 20:00)  HR: 72 (10 Sep 2022 06:00) (69 - 95)  BP: 125/63 (10 Sep 2022 06:00) (99/70 - 140/66)  BP(mean): 82 (10 Sep 2022 06:00) (54 - 96)  ABP: --  ABP(mean): --  RR: 19 (10 Sep 2022 06:00) (16 - 26)  SpO2: 99% (10 Sep 2022 06:00) (95% - 100%)    O2 Parameters below as of 10 Sep 2022 06:00  Patient On (Oxygen Delivery Method): room air            Plateau pressure:   P/F ratio:     09-09 @ 07:01  -  09-10 @ 07:00  --------------------------------------------------------  IN: 450 mL / OUT: 1300 mL / NET: -850 mL      CAPILLARY BLOOD GLUCOSE  87 (08 Sep 2022 16:57)      POCT Blood Glucose.: 87 mg/dL (08 Sep 2022 16:49)    ECG:    PHYSICAL EXAMINATION:  General: Comfortable, no acute distress, cooperative with exam.  HEENT: PERRLA, EOMI, moist mucous membranes.  Respiratory: CTAB, normal respiratory effort, no coughing, wheezes, crackles, or rales.  CV: RRR, S1S2, no murmurs, rubs or gallops. No JVD. Distal pulses intact.  Abdominal: Soft, nontender, nondistended, no rebound or guarding, normal bowel sounds.  Neurology: AOx3, no focal neuro defects, KHAN x 4.  Extremities: No pitting edema, + Peripheral pulses.  Incisions:   Tubes:    MEDICATIONS:  MEDICATIONS  (STANDING):  chlorhexidine 2% Cloths 1 Application(s) Topical daily  influenza  Vaccine (HIGH DOSE) 0.7 milliLiter(s) IntraMuscular once  levothyroxine 75 MICROGram(s) Oral daily  melatonin 3 milliGRAM(s) Oral at bedtime    MEDICATIONS  (PRN):  acetaminophen     Tablet .. 650 milliGRAM(s) Oral every 6 hours PRN Moderate Pain (4 - 6)  fexofenadine 180 milliGRAM(s) 1 Tablet(s) Oral two times a day PRN Hives      ALLERGIES:  Allergies    &quot;cholesterol medications&quot;  gets &quot;jaundiced&quot; (Unknown)  dairy products (Unknown)  Gluten (Diarrhea)  Grapes (Hives)  ibuprofen (Other)  Pineapple (Hives)    Intolerances        LABS:                        14.7   12.46 )-----------( 239      ( 10 Sep 2022 05:10 )             44.0     09-09    136  |  106  |  8   ----------------------------<  77  3.1<L>   |  18<L>  |  0.54    Ca    7.2<L>      09 Sep 2022 03:25  Phos  2.9     09-09  Mg     1.70     09-09    TPro  5.6<L>  /  Alb  3.3  /  TBili  0.7  /  DBili  x   /  AST  21  /  ALT  20  /  AlkPhos  52  09-09    PT/INR - ( 10 Sep 2022 05:10 )   PT: 12.8 sec;   INR: 1.10 ratio         PTT - ( 10 Sep 2022 05:10 )  PTT:27.4 sec  Urinalysis Basic - ( 09 Sep 2022 06:50 )    Color: Yellow / Appearance: Clear / SG: >1.050 / pH: x  Gluc: x / Ketone: Small  / Bili: Negative / Urobili: <2 mg/dL   Blood: x / Protein: Trace / Nitrite: Negative   Leuk Esterase: Negative / RBC: 2 /HPF / WBC 3 /HPF   Sq Epi: x / Non Sq Epi: 0 /HPF / Bacteria: Negative        RADIOLOGY & ADDITIONAL TESTS: Reviewed.   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 Today she reports feeling well and is able to carry on a full conversation, is pleasant and making jokes. She reports being able to eat well and is hungry. She does endorse a mild headache that is improved with tylenol and some neck stiffness. She otherwise denies acute vision changes, dizziness, weakness, difficulty speaking, CP, SOB, abd pain, n/v/d/c, leg pain or swelling.    VITAL SIGNS:  ICU Vital Signs Last 24 Hrs  T(C): 36.4 (10 Sep 2022 00:00), Max: 37.2 (09 Sep 2022 20:00)  T(F): 97.5 (10 Sep 2022 00:00), Max: 98.9 (09 Sep 2022 20:00)  HR: 72 (10 Sep 2022 06:00) (69 - 95)  BP: 125/63 (10 Sep 2022 06:00) (99/70 - 140/66)  BP(mean): 82 (10 Sep 2022 06:00) (54 - 96)  ABP: --  ABP(mean): --  RR: 19 (10 Sep 2022 06:00) (16 - 26)  SpO2: 99% (10 Sep 2022 06:00) (95% - 100%)    O2 Parameters below as of 10 Sep 2022 06:00  Patient On (Oxygen Delivery Method): room air            Plateau pressure:   P/F ratio:     09-09 @ 07:01  -  09-10 @ 07:00  --------------------------------------------------------  IN: 450 mL / OUT: 1300 mL / NET: -850 mL      CAPILLARY BLOOD GLUCOSE  87 (08 Sep 2022 16:57)      POCT Blood Glucose.: 87 mg/dL (08 Sep 2022 16:49)    PHYSICAL EXAM:  GENERAL: NAD, conversant  CHEST/LUNG: Clear to auscultation bilaterally; No crackles, rhonchi, wheezing, or rubs  HEART: Regular rate and rhythm; No murmurs, rubs, or gallops  ABDOMEN: Soft, Nontender, Nondistended; Bowel sounds present  EXTREMITIES:  2+ Peripheral Pulses, No clubbing, cyanosis, or edema  SKIN: No rashes or lesions  NERVOUS SYSTEM: Alert & Oriented, Good concentration, one neuro exam the patient could not see out the periphery of the L side. On tracking and movement of the eye the patient stopped following when tracking to the left and was not able to move her eyes to the left completely. Otherwise neuro physical exam was unremarkable.    MUSCULOSKELETAL: 5/5 strength in all extremities    MEDICATIONS:  MEDICATIONS  (STANDING):  chlorhexidine 2% Cloths 1 Application(s) Topical daily  influenza  Vaccine (HIGH DOSE) 0.7 milliLiter(s) IntraMuscular once  levothyroxine 75 MICROGram(s) Oral daily  melatonin 3 milliGRAM(s) Oral at bedtime    MEDICATIONS  (PRN):  acetaminophen     Tablet .. 650 milliGRAM(s) Oral every 6 hours PRN Moderate Pain (4 - 6)  fexofenadine 180 milliGRAM(s) 1 Tablet(s) Oral two times a day PRN Hives      ALLERGIES:  Allergies    &quot;cholesterol medications&quot;  gets &quot;jaundiced&quot; (Unknown)  dairy products (Unknown)  Gluten (Diarrhea)  Grapes (Hives)  ibuprofen (Other)  Pineapple (Hives)    Intolerances        LABS:                        14.7   12.46 )-----------( 239      ( 10 Sep 2022 05:10 )             44.0     09-09    136  |  106  |  8   ----------------------------<  77  3.1<L>   |  18<L>  |  0.54    Ca    7.2<L>      09 Sep 2022 03:25  Phos  2.9     09-09  Mg     1.70     09-09    TPro  5.6<L>  /  Alb  3.3  /  TBili  0.7  /  DBili  x   /  AST  21  /  ALT  20  /  AlkPhos  52  09-09    PT/INR - ( 10 Sep 2022 05:10 )   PT: 12.8 sec;   INR: 1.10 ratio         PTT - ( 10 Sep 2022 05:10 )  PTT:27.4 sec  Urinalysis Basic - ( 09 Sep 2022 06:50 )    Color: Yellow / Appearance: Clear / SG: >1.050 / pH: x  Gluc: x / Ketone: Small  / Bili: Negative / Urobili: <2 mg/dL   Blood: x / Protein: Trace / Nitrite: Negative   Leuk Esterase: Negative / RBC: 2 /HPF / WBC 3 /HPF   Sq Epi: x / Non Sq Epi: 0 /HPF / Bacteria: Negative        RADIOLOGY & ADDITIONAL TESTS: Reviewed.   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 Today she reports feeling well and is able to carry on a full conversation, is pleasant and making jokes. She reports being able to eat well and is hungry.  Is ambulating with assistance. She does endorse a mild headache that is improved with tylenol and some neck stiffness. She otherwise denies acute vision changes, dizziness, weakness, difficulty speaking, CP, SOB, abd pain, n/v/d/c, leg pain or swelling.    VITAL SIGNS:  ICU Vital Signs Last 24 Hrs  T(C): 36.4 (10 Sep 2022 00:00), Max: 37.2 (09 Sep 2022 20:00)  T(F): 97.5 (10 Sep 2022 00:00), Max: 98.9 (09 Sep 2022 20:00)  HR: 72 (10 Sep 2022 06:00) (69 - 95)  BP: 125/63 (10 Sep 2022 06:00) (99/70 - 140/66)  BP(mean): 82 (10 Sep 2022 06:00) (54 - 96)  ABP: --  ABP(mean): --  RR: 19 (10 Sep 2022 06:00) (16 - 26)  SpO2: 99% (10 Sep 2022 06:00) (95% - 100%)    O2 Parameters below as of 10 Sep 2022 06:00  Patient On (Oxygen Delivery Method): room air            Plateau pressure:   P/F ratio:     09-09 @ 07:01  -  09-10 @ 07:00  --------------------------------------------------------  IN: 450 mL / OUT: 1300 mL / NET: -850 mL      CAPILLARY BLOOD GLUCOSE  87 (08 Sep 2022 16:57)      POCT Blood Glucose.: 87 mg/dL (08 Sep 2022 16:49)    PHYSICAL EXAM:  GENERAL: NAD, conversant  CHEST/LUNG: Clear to auscultation bilaterally; No crackles, rhonchi, wheezing, or rubs  HEART: Regular rate and rhythm; No murmurs, rubs, or gallops  ABDOMEN: Soft, Nontender, Nondistended; Bowel sounds present  EXTREMITIES:  2+ Peripheral Pulses, No clubbing, cyanosis, or edema  SKIN: No rashes or lesions  NERVOUS SYSTEM: Alert & Oriented, Good concentration, one neuro exam the patient could not see out the periphery of the L side. On tracking and movement of the eye the patient stopped following when tracking to the left and was not able to move her eyes to the left completely. Otherwise neuro physical exam was unremarkable.    MUSCULOSKELETAL: 5/5 strength in all extremities    MEDICATIONS:  MEDICATIONS  (STANDING):  chlorhexidine 2% Cloths 1 Application(s) Topical daily  influenza  Vaccine (HIGH DOSE) 0.7 milliLiter(s) IntraMuscular once  levothyroxine 75 MICROGram(s) Oral daily  melatonin 3 milliGRAM(s) Oral at bedtime    MEDICATIONS  (PRN):  acetaminophen     Tablet .. 650 milliGRAM(s) Oral every 6 hours PRN Moderate Pain (4 - 6)  fexofenadine 180 milliGRAM(s) 1 Tablet(s) Oral two times a day PRN Hives      ALLERGIES:  Allergies    &quot;cholesterol medications&quot;  gets &quot;jaundiced&quot; (Unknown)  dairy products (Unknown)  Gluten (Diarrhea)  Grapes (Hives)  ibuprofen (Other)  Pineapple (Hives)    Intolerances        LABS:                        14.7   12.46 )-----------( 239      ( 10 Sep 2022 05:10 )             44.0     09-09    136  |  106  |  8   ----------------------------<  77  3.1<L>   |  18<L>  |  0.54    Ca    7.2<L>      09 Sep 2022 03:25  Phos  2.9     09-09  Mg     1.70     09-09    TPro  5.6<L>  /  Alb  3.3  /  TBili  0.7  /  DBili  x   /  AST  21  /  ALT  20  /  AlkPhos  52  09-09    PT/INR - ( 10 Sep 2022 05:10 )   PT: 12.8 sec;   INR: 1.10 ratio         PTT - ( 10 Sep 2022 05:10 )  PTT:27.4 sec  Urinalysis Basic - ( 09 Sep 2022 06:50 )    Color: Yellow / Appearance: Clear / SG: >1.050 / pH: x  Gluc: x / Ketone: Small  / Bili: Negative / Urobili: <2 mg/dL   Blood: x / Protein: Trace / Nitrite: Negative   Leuk Esterase: Negative / RBC: 2 /HPF / WBC 3 /HPF   Sq Epi: x / Non Sq Epi: 0 /HPF / Bacteria: Negative        RADIOLOGY & ADDITIONAL TESTS: Reviewed.

## 2022-09-10 NOTE — PHYSICAL THERAPY INITIAL EVALUATION ADULT - GAIT DEVIATIONS NOTED, PT EVAL
challenged patient to increase amee and pt demonstrated ability to ambulate at increased speed/decreased amee/decreased weight-shifting ability

## 2022-09-10 NOTE — PROGRESS NOTE ADULT - ASSESSMENT
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      #Neuro:  ##R occipital IPH  CTH 9/8 @5PM:  1 x 2.1 x 3.9 cm hemorrhage in right occipital lobe.  CTA 9/9: c/f anterior venous malformation  P/w L hemianopsia  4 hour repeat CTH - ordered and confirmed with CT dept - showed no prgression of IPH   CT venogram - to eval venous malformation , to be done with 4 hour repeat CTH - noted a right occipital lobe hematoma with surrounding vasogenic edema without significant change in size or appearance from the prior exam.   likely 2/2 trauma +/- venous malformation  neurosx: no acute intervention per phone   Pending neurology recommendation on 24 hour CT repeat and q4h neurology checks   neurology: MR brain, TTE ordered.        #Cardiovascular:  -BP  138/58   Strict BP control goal <160/90 w/ Cardene drip if needed    #Pulmonary:  -CXR clear lungs  -on RA, no active issue    #FEN/GI:  -hx of celiac dz  -patient passed dysphagia study, can begin gluten free, dairy free diet    #Renal:  -D/C fluids     #:  -pending UCx    #ID:  ##Fever  -T 100.6, WBC 13, may be reactive from IPH, no localizing symptoms  - No further fevers, WBCs downtrending   CXR clear lungs  - f/u bcx sent by  ED  - UA - No signs of infxn   - UCx ordered  Monitor off abx    #Heme:  -No chemical AC due to bleed  SCDs    #Endo:  -Hx of grave's dz, s/p radioactive iodine, now hypothyroid on synthroid  c/w home synthroid    #Ethics:  -2 children Sinan [phone]  Samantha [phone]  full code 76F PMH hypothyroidism, Celiac disease, allergies, dementia (B/l aaox2), 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      #Neuro:  ##R occipital IPH  CTH 9/8 @5PM:  1 x 2.1 x 3.9 cm hemorrhage in right occipital lobe.  CTA 9/9: c/f anterior venous malformation  P/w L hemianopsia  4 hour repeat CTH - ordered and confirmed with CT dept - showed no prgression of IPH   CT venogram - to eval venous malformation , to be done with 4 hour repeat CTH - noted a right occipital lobe hematoma with surrounding vasogenic edema without significant change in size or appearance from the prior exam.   likely 2/2 trauma +/- venous malformation  neurosx: no acute intervention per phone  Pending neurology recommendation on 24 hour CT repeat and q4h neurology checks   neurology: MR brain, TTE ordered.    #Cardiovascular:  -BP  138/58   Strict BP control goal <160/90 w/ Cardene drip if needed    #Pulmonary:  -CXR clear lungs  -on RA, no active issue    #FEN/GI:  -hx of celiac dz  -patient passed dysphagia study, can begin gluten free, dairy free diet    #Renal:  -D/C fluids     #:  -pending UCx    #ID:  ##Fever  -T 100.6, WBC 13, may be reactive from IPH, no localizing symptoms  - No further fevers, WBCs downtrending   CXR clear lungs  - f/u bcx sent by  ED  - UA - No signs of infxn   - UCx ordered  Monitor off abx    #Heme:  -No chemical AC due to bleed  SCDs    #Endo:  -Hx of grave's dz, s/p radioactive iodine, now hypothyroid on synthroid  c/w home synthroid    #Ethics:  -2 children Sinan [phone]  Samantha [phone]  full code 76F PMH hypothyroidism, Celiac disease, allergies, dementia (B/l aaox2), 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      #Neuro:  ##R occipital IPH  CTH 9/8 @5PM:  1 x 2.1 x 3.9 cm hemorrhage in right occipital lobe.  CTA 9/9: c/f anterior venous malformation  P/w L hemianopsia  4 hour repeat CTH - ordered and confirmed with CT dept - showed no prgression of IPH   CT venogram - to eval venous malformation , to be done with 4 hour repeat CTH - noted a right occipital lobe hematoma with surrounding vasogenic edema without significant change in size or appearance from the prior exam.   likely 2/2 trauma +/- venous malformation  neurosx: no acute intervention per phone, repeat CT on 9/15  Pending neurology recommendation on 24 hour CT repeat and q4h neurology checks   neurology: MR brain, TTE ordered.    #Cardiovascular:  -BP  138/58   Strict BP control goal <160/90 w/ Cardene drip if needed    #Pulmonary:  -CXR clear lungs  -on RA, no active issue    #FEN/GI:  -hx of celiac dz  -patient passed dysphagia study, can begin gluten free, dairy free diet    #Renal:  -D/C fluids     #:  -pending UCx    #ID:  ##Fever  -T 100.6, WBC 13, may be reactive from IPH, no localizing symptoms  - No further fevers, WBCs downtrending   CXR clear lungs  - f/u bcx sent by  ED  - UA - No signs of infxn   - UCx ordered  Monitor off abx    #Heme:  -No chemical AC due to bleed  SCDs    #Endo:  -Hx of grave's dz, s/p radioactive iodine, now hypothyroid on synthroid  c/w home synthroid    #Ethics:  -2 children Sinan [phone]  Samantha [phone]  full code

## 2022-09-10 NOTE — PHYSICAL THERAPY INITIAL EVALUATION ADULT - PERTINENT HX OF CURRENT PROBLEM, REHAB EVAL
Pt is a 76 year old female presenting with lethargy, abnormal gait, worsening mental status over the last 5-6 months, and bruise on the L forehead and L upper thigh. CT head significant for acute intracranial hemorrhage within the right occipital lobe with surrounding edema and extension of hemorrhage into the right lateral ventricle; mild mass effect on the right lateral ventricle.

## 2022-09-10 NOTE — PHYSICAL THERAPY INITIAL EVALUATION ADULT - GENERAL OBSERVATIONS, REHAB EVAL
Upon entry, pt semi-supine in bed in NAD; + ICU monitoring. PCA present at bedside. Pt left seated in bedside chair with all lines intact in NAD. RN Joselin bryant.

## 2022-09-10 NOTE — PHYSICAL THERAPY INITIAL EVALUATION ADULT - PATIENT PROFILE REVIEW, REHAB EVAL
PT initial evaluation received and chart review completed. Pt agreeable to participate in PT evaluation. Pt cleared by MAGDALENA Olivera./yes

## 2022-09-11 LAB
ALBUMIN SERPL ELPH-MCNC: 4.2 G/DL — SIGNIFICANT CHANGE UP (ref 3.3–5)
ALP SERPL-CCNC: 72 U/L — SIGNIFICANT CHANGE UP (ref 40–120)
ALT FLD-CCNC: 18 U/L — SIGNIFICANT CHANGE UP (ref 4–33)
ANION GAP SERPL CALC-SCNC: 14 MMOL/L — SIGNIFICANT CHANGE UP (ref 7–14)
AST SERPL-CCNC: 21 U/L — SIGNIFICANT CHANGE UP (ref 4–32)
BILIRUB SERPL-MCNC: 0.7 MG/DL — SIGNIFICANT CHANGE UP (ref 0.2–1.2)
BUN SERPL-MCNC: 13 MG/DL — SIGNIFICANT CHANGE UP (ref 7–23)
CALCIUM SERPL-MCNC: 9.7 MG/DL — SIGNIFICANT CHANGE UP (ref 8.4–10.5)
CHLORIDE SERPL-SCNC: 100 MMOL/L — SIGNIFICANT CHANGE UP (ref 98–107)
CO2 SERPL-SCNC: 21 MMOL/L — LOW (ref 22–31)
CREAT SERPL-MCNC: 0.63 MG/DL — SIGNIFICANT CHANGE UP (ref 0.5–1.3)
EGFR: 93 ML/MIN/1.73M2 — SIGNIFICANT CHANGE UP
GLUCOSE SERPL-MCNC: 95 MG/DL — SIGNIFICANT CHANGE UP (ref 70–99)
HCT VFR BLD CALC: 42.5 % — SIGNIFICANT CHANGE UP (ref 34.5–45)
HGB BLD-MCNC: 15.1 G/DL — SIGNIFICANT CHANGE UP (ref 11.5–15.5)
MAGNESIUM SERPL-MCNC: 2.1 MG/DL — SIGNIFICANT CHANGE UP (ref 1.6–2.6)
MCHC RBC-ENTMCNC: 32.7 PG — SIGNIFICANT CHANGE UP (ref 27–34)
MCHC RBC-ENTMCNC: 35.5 GM/DL — SIGNIFICANT CHANGE UP (ref 32–36)
MCV RBC AUTO: 92 FL — SIGNIFICANT CHANGE UP (ref 80–100)
NRBC # BLD: 0 /100 WBCS — SIGNIFICANT CHANGE UP (ref 0–0)
NRBC # FLD: 0 K/UL — SIGNIFICANT CHANGE UP (ref 0–0)
PHOSPHATE SERPL-MCNC: 4.4 MG/DL — SIGNIFICANT CHANGE UP (ref 2.5–4.5)
PLATELET # BLD AUTO: 254 K/UL — SIGNIFICANT CHANGE UP (ref 150–400)
POTASSIUM SERPL-MCNC: 4.2 MMOL/L — SIGNIFICANT CHANGE UP (ref 3.5–5.3)
POTASSIUM SERPL-SCNC: 4.2 MMOL/L — SIGNIFICANT CHANGE UP (ref 3.5–5.3)
PROT SERPL-MCNC: 7.2 G/DL — SIGNIFICANT CHANGE UP (ref 6–8.3)
RBC # BLD: 4.62 M/UL — SIGNIFICANT CHANGE UP (ref 3.8–5.2)
RBC # FLD: 12.3 % — SIGNIFICANT CHANGE UP (ref 10.3–14.5)
SODIUM SERPL-SCNC: 135 MMOL/L — SIGNIFICANT CHANGE UP (ref 135–145)
WBC # BLD: 8.72 K/UL — SIGNIFICANT CHANGE UP (ref 3.8–10.5)
WBC # FLD AUTO: 8.72 K/UL — SIGNIFICANT CHANGE UP (ref 3.8–10.5)

## 2022-09-11 PROCEDURE — 99291 CRITICAL CARE FIRST HOUR: CPT

## 2022-09-11 PROCEDURE — 93010 ELECTROCARDIOGRAM REPORT: CPT

## 2022-09-11 RX ORDER — ACETAMINOPHEN 500 MG
650 TABLET ORAL ONCE
Refills: 0 | Status: COMPLETED | OUTPATIENT
Start: 2022-09-11 | End: 2022-09-11

## 2022-09-11 RX ORDER — ACETAMINOPHEN 500 MG
1000 TABLET ORAL ONCE
Refills: 0 | Status: COMPLETED | OUTPATIENT
Start: 2022-09-11 | End: 2022-09-11

## 2022-09-11 RX ADMIN — Medication 3 MILLIGRAM(S): at 22:02

## 2022-09-11 RX ADMIN — Medication 3 MILLIGRAM(S): at 00:47

## 2022-09-11 RX ADMIN — Medication 650 MILLIGRAM(S): at 13:07

## 2022-09-11 RX ADMIN — Medication 650 MILLIGRAM(S): at 22:02

## 2022-09-11 RX ADMIN — Medication 650 MILLIGRAM(S): at 10:43

## 2022-09-11 RX ADMIN — Medication 75 MICROGRAM(S): at 06:39

## 2022-09-11 RX ADMIN — Medication 650 MILLIGRAM(S): at 23:19

## 2022-09-11 RX ADMIN — Medication 650 MILLIGRAM(S): at 02:49

## 2022-09-11 RX ADMIN — Medication 650 MILLIGRAM(S): at 03:17

## 2022-09-11 RX ADMIN — Medication 650 MILLIGRAM(S): at 14:10

## 2022-09-11 RX ADMIN — Medication 650 MILLIGRAM(S): at 08:40

## 2022-09-11 NOTE — PROGRESS NOTE ADULT - SUBJECTIVE AND OBJECTIVE BOX
MICU PROGRESS NOTE    INTERVAL HPI/OVERNIGHT EVENTS:  no overnight events    SUBJECTIVE:   76F PMH hypothyroidism, Celiac disease, allergies, dementia (B/l aaox2), 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 The patient states she is having a HA this morning with neck pain but otherwise is doing fine. The patient states that she does not have any changes otherwise. The patient is bed boarded and awaiting transfer.     OBJECTIVE:    VITAL SIGNS:  ICU Vital Signs Last 24 Hrs  T(C): 36.7 (11 Sep 2022 11:05), Max: 37.3 (10 Sep 2022 20:00)  T(F): 98.1 (11 Sep 2022 11:05), Max: 99.1 (10 Sep 2022 20:00)  HR: 67 (11 Sep 2022 11:05) (62 - 87)  BP: 135/73 (11 Sep 2022 11:05) (113/68 - 147/69)  BP(mean): 85 (11 Sep 2022 08:00) (80 - 105)  ABP: --  ABP(mean): --  RR: 17 (11 Sep 2022 11:05) (13 - 23)  SpO2: 97% (11 Sep 2022 11:05) (96% - 100%)    O2 Parameters below as of 11 Sep 2022 11:05  Patient On (Oxygen Delivery Method): room air            VENTS:      I&O:    09-10 @ 07:01  -  09-11 @ 07:00  --------------------------------------------------------  IN: 200 mL / OUT: 1700 mL / NET: -1500 mL        PHYSICAL EXAM:  GENERAL: NAD, conversant  CHEST/LUNG: Clear to auscultation bilaterally; No crackles, rhonchi, wheezing, or rubs  HEART: Regular rate and rhythm; No murmurs, rubs, or gallops  ABDOMEN: Soft, Nontender, Nondistended; Bowel sounds present  EXTREMITIES:  2+ Peripheral Pulses, No clubbing, cyanosis, or edema  SKIN: No rashes or lesions  NERVOUS SYSTEM:  Alert & Oriented, Good concentration, left hemianopsia is still present.                                      MEDICATIONS:  MEDICATIONS  (STANDING):  influenza  Vaccine (HIGH DOSE) 0.7 milliLiter(s) IntraMuscular once  levothyroxine 75 MICROGram(s) Oral daily  melatonin 3 milliGRAM(s) Oral at bedtime    MEDICATIONS  (PRN):  acetaminophen     Tablet .. 650 milliGRAM(s) Oral every 6 hours PRN Temp greater or equal to 38C (100.4F), Mild Pain (1 - 3)  fexofenadine 180 milliGRAM(s) 1 Tablet(s) Oral two times a day PRN Hives      ALLERGIES:  Allergies    &quot;cholesterol medications&quot;  gets &quot;jaundiced&quot; (Unknown)  dairy products (Unknown)  Gluten (Diarrhea)  Grapes (Hives)  ibuprofen (Other)  Pineapple (Hives)    Intolerances        LABS:                        15.1   8.72  )-----------( 254      ( 11 Sep 2022 06:50 )             42.5     09-11    135  |  100  |  13  ----------------------------<  95  4.2   |  21<L>  |  0.63    Ca    9.7      11 Sep 2022 06:50  Phos  4.4     09-11  Mg     2.10     09-11    TPro  7.2  /  Alb  4.2  /  TBili  0.7  /  DBili  x   /  AST  21  /  ALT  18  /  AlkPhos  72  09-11    PT/INR - ( 10 Sep 2022 05:10 )   PT: 12.8 sec;   INR: 1.10 ratio         PTT - ( 10 Sep 2022 05:10 )  PTT:27.4 sec      CAPILLARY BLOOD GLUCOSE          RADIOLOGY & ADDITIONAL TESTS: Reviewed.

## 2022-09-11 NOTE — PROGRESS NOTE ADULT - ATTENDING COMMENTS
76 F with hypothyroidism, dementia here with L hemianopsia and gait instability found to have acute intracranial hemorrhage in R occipital lobe    - c/w frequent neuro checks  - BP control SBP goal <140/90, cardene as needed  - f/u Neuro/neurosx reccs given concern for aneurysm seen on CT   - c/w synthroid for hypothyroidism  - suspect L hemianopsia is due to bleed, f/u neuro reccs  - s/s eval for oropharyngeal dysphagia     Critically ill patient requiring frequent bedside visits with therapy changes.
76 F with hypothyroidism, dementia here with L hemianopsia and gait instability found to have acute intracranial hemorrhage in R occipital lobe    - c/w frequent neuro checks  - BP control SBP goal <140/90, cardene as needed  - f/u Neuro/neurosx reccs given concern for aneurysm seen on CT   - c/w synthroid for hypothyroidism  - suspect L hemianopsia is due to bleed, f/u neuro reccs  - s/s eval for oropharyngeal dysphagia     Prognosis is guarded.
76 F with hypothyroidism, dementia here with L hemianopsia and gait instability found to have acute intracranial hemorrhage in R occipital lobe    - c/w frequent neuro checks  - BP control SBP goal <140/90, cardene as needed  - f/u Neuro/neurosx reccs given concern for aneurysm seen on CT   - c/w synthroid for hypothyroidism  - suspect L hemianopsia is due to bleed, f/u neuro reccs  - s/s eval for oropharyngeal dysphagia     Prognosis is guarded

## 2022-09-11 NOTE — OCCUPATIONAL THERAPY INITIAL EVALUATION ADULT - PLANNED THERAPY INTERVENTIONS, OT EVAL
ADL retraining/balance training/bed mobility training/cognitive, visual perceptual/fine motor coordination training/motor coordination training/strengthening/transfer training

## 2022-09-11 NOTE — CHART NOTE - NSCHARTNOTEFT_GEN_A_CORE
MAR Accept Note  Transfer to: [facility]  Accepting Attending Physician: Dr. Morales  Assigned Room:  44    Patient seen and examined.   Labs and data reviewed.   No findings precluding transfer of service.       HPI/MICU COURSE:   Please refer to MICU transfer note for full details. Briefly, this is a 76F PMH hypothyroidism, Celiac disease, allergies, dementia (B/l aaox2), PSH PDA repair p/w AMS, lethargy, HA, visual deficit, gait abn, c/f fall w/ impact to L side given L forehead and LLE ecchymoses;CTH revealed "A 4.1 x 2.1 x 3.9 cm focus of hyperdensity within the right occipital lobe with surrounding edema, consistent with acute hemorrhage. There is extension of hemorrhage into the right lateral ventricle occipital horn." CTA with " IMPRESSION: Normal CTA of the neck. There is slightly increased vascularity in the right occipital lobe medial to the parenchymal hemorrhage suspicious for an anterior venous malformation". Admit to MICU for neurologic monitoring, found to be AOx2 (which is bl), agitated, w/ L visual deficit and difficulty w/ tracking. CT head showed no progression of IPH. Pending MRI brain and TTE. Per neurosurgery, repeat CT scan should be done in 1 week (9/15).       FOR FOLLOW-UP:  [ ] MRI head   [ ] BP control <160/90  [ ] Na control 135-150  [ ] Neuro check q4hrs  [ ] CT head 9/15  [ ] f/u Cx data given pt febrile during admit     Michelle Hawkins MD PGY-3  Internal Medicine Resident  [facility] MAR [id number]

## 2022-09-11 NOTE — OCCUPATIONAL THERAPY INITIAL EVALUATION ADULT - PERTINENT HX OF CURRENT PROBLEM, REHAB EVAL
Pt is a 76 year old female presenting with lethargy, abnormal gait, worsening mental status over the last 5-6 months, and bruise on the L forehead and L upper thigh. CT head significant for acute intracranial hemorrhage within the right occipital lobe with surrounding edema and extension of hemorrhage into the right lateral ventricle; mild mass effect on the right lateral ventricle. Pt is a 76 year old female presenting with lethargy, abnormal gait, worsening mental status over the last 5-6 months, and bruise on the left forehead and left upper thigh. CT head significant for acute intracranial hemorrhage within the right occipital lobe with surrounding edema and extension of hemorrhage into the right lateral ventricle; mild mass effect on the right lateral ventricle.

## 2022-09-11 NOTE — PROGRESS NOTE ADULT - ASSESSMENT
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      #Neuro:  ##R occipital IPH  CTH 9/8 @5PM:  1 x 2.1 x 3.9 cm hemorrhage in right occipital lobe.  CTA 9/9: c/f anterior venous malformation  P/w L hemianopsia  4 hour repeat CTH - ordered and confirmed with CT dept - showed no prgression of IPH   CT venogram - to eval venous malformation , to be done with 4 hour repeat CTH - noted a right occipital lobe hematoma with surrounding vasogenic edema without significant change in size or appearance from the prior exam.   likely 2/2 trauma +/- venous malformation  neurosx: no acute intervention per phone, repeat CT on 9/15  Pending neurology recommendation on 24 hour CT repeat and q4h neurology checks   neurology: MR brain, TTE ordered.    #Cardiovascular:  -BP  138/58   Strict BP control goal <160/90 w/ Cardene drip if needed    #Pulmonary:  -CXR clear lungs  -on RA, no active issue    #FEN/GI:  -hx of celiac dz  -patient passed dysphagia study, can begin gluten free, dairy free diet    #Renal:  -D/C fluids     #:  -pending UCx    #ID:  ##Fever  -T 100.6, WBC 13, may be reactive from IPH, no localizing symptoms  - No further fevers, WBCs downtrending   CXR clear lungs  - f/u bcx sent by  ED  - UA - No signs of infxn   - UCx ordered  Monitor off abx    #Heme:  -No chemical AC due to bleed  SCDs    #Endo:  -Hx of grave's dz, s/p radioactive iodine, now hypothyroid on synthroid  c/w home synthroid    #Ethics:  -2 children Sinan [phone]  Samantha [phone]  full code

## 2022-09-12 LAB
ANION GAP SERPL CALC-SCNC: 13 MMOL/L — SIGNIFICANT CHANGE UP (ref 7–14)
ANION GAP SERPL CALC-SCNC: 13 MMOL/L — SIGNIFICANT CHANGE UP (ref 7–14)
BUN SERPL-MCNC: 12 MG/DL — SIGNIFICANT CHANGE UP (ref 7–23)
BUN SERPL-MCNC: 14 MG/DL — SIGNIFICANT CHANGE UP (ref 7–23)
CALCIUM SERPL-MCNC: 9.6 MG/DL — SIGNIFICANT CHANGE UP (ref 8.4–10.5)
CALCIUM SERPL-MCNC: 9.6 MG/DL — SIGNIFICANT CHANGE UP (ref 8.4–10.5)
CHLORIDE SERPL-SCNC: 96 MMOL/L — LOW (ref 98–107)
CHLORIDE SERPL-SCNC: 96 MMOL/L — LOW (ref 98–107)
CHLORIDE UR-SCNC: 109 MMOL/L — SIGNIFICANT CHANGE UP
CO2 SERPL-SCNC: 21 MMOL/L — LOW (ref 22–31)
CO2 SERPL-SCNC: 24 MMOL/L — SIGNIFICANT CHANGE UP (ref 22–31)
CREAT SERPL-MCNC: 0.55 MG/DL — SIGNIFICANT CHANGE UP (ref 0.5–1.3)
CREAT SERPL-MCNC: 0.63 MG/DL — SIGNIFICANT CHANGE UP (ref 0.5–1.3)
EGFR: 93 ML/MIN/1.73M2 — SIGNIFICANT CHANGE UP
EGFR: 96 ML/MIN/1.73M2 — SIGNIFICANT CHANGE UP
GLUCOSE SERPL-MCNC: 119 MG/DL — HIGH (ref 70–99)
GLUCOSE SERPL-MCNC: 96 MG/DL — SIGNIFICANT CHANGE UP (ref 70–99)
HCT VFR BLD CALC: 41.6 % — SIGNIFICANT CHANGE UP (ref 34.5–45)
HGB BLD-MCNC: 14.8 G/DL — SIGNIFICANT CHANGE UP (ref 11.5–15.5)
MAGNESIUM SERPL-MCNC: 1.9 MG/DL — SIGNIFICANT CHANGE UP (ref 1.6–2.6)
MAGNESIUM SERPL-MCNC: 2 MG/DL — SIGNIFICANT CHANGE UP (ref 1.6–2.6)
MCHC RBC-ENTMCNC: 32.2 PG — SIGNIFICANT CHANGE UP (ref 27–34)
MCHC RBC-ENTMCNC: 35.6 GM/DL — SIGNIFICANT CHANGE UP (ref 32–36)
MCV RBC AUTO: 90.6 FL — SIGNIFICANT CHANGE UP (ref 80–100)
NRBC # BLD: 0 /100 WBCS — SIGNIFICANT CHANGE UP (ref 0–0)
NRBC # FLD: 0 K/UL — SIGNIFICANT CHANGE UP (ref 0–0)
OSMOLALITY UR: 896 MOSM/KG — SIGNIFICANT CHANGE UP (ref 50–1200)
PHOSPHATE SERPL-MCNC: 3.1 MG/DL — SIGNIFICANT CHANGE UP (ref 2.5–4.5)
PHOSPHATE SERPL-MCNC: 3.4 MG/DL — SIGNIFICANT CHANGE UP (ref 2.5–4.5)
PLATELET # BLD AUTO: 273 K/UL — SIGNIFICANT CHANGE UP (ref 150–400)
POTASSIUM SERPL-MCNC: 3.7 MMOL/L — SIGNIFICANT CHANGE UP (ref 3.5–5.3)
POTASSIUM SERPL-MCNC: 3.9 MMOL/L — SIGNIFICANT CHANGE UP (ref 3.5–5.3)
POTASSIUM SERPL-SCNC: 3.7 MMOL/L — SIGNIFICANT CHANGE UP (ref 3.5–5.3)
POTASSIUM SERPL-SCNC: 3.9 MMOL/L — SIGNIFICANT CHANGE UP (ref 3.5–5.3)
POTASSIUM UR-SCNC: 88.3 MMOL/L — SIGNIFICANT CHANGE UP
RBC # BLD: 4.59 M/UL — SIGNIFICANT CHANGE UP (ref 3.8–5.2)
RBC # FLD: 12.1 % — SIGNIFICANT CHANGE UP (ref 10.3–14.5)
SODIUM SERPL-SCNC: 130 MMOL/L — LOW (ref 135–145)
SODIUM SERPL-SCNC: 133 MMOL/L — LOW (ref 135–145)
SODIUM UR-SCNC: 84 MMOL/L — SIGNIFICANT CHANGE UP
TSH SERPL-MCNC: 8.96 UIU/ML — HIGH (ref 0.27–4.2)
WBC # BLD: 10.91 K/UL — HIGH (ref 3.8–10.5)
WBC # FLD AUTO: 10.91 K/UL — HIGH (ref 3.8–10.5)

## 2022-09-12 PROCEDURE — 99233 SBSQ HOSP IP/OBS HIGH 50: CPT

## 2022-09-12 PROCEDURE — 70553 MRI BRAIN STEM W/O & W/DYE: CPT | Mod: 26

## 2022-09-12 RX ORDER — ACETAMINOPHEN 500 MG
575 TABLET ORAL ONCE
Refills: 0 | Status: COMPLETED | OUTPATIENT
Start: 2022-09-12 | End: 2022-09-12

## 2022-09-12 RX ADMIN — Medication 650 MILLIGRAM(S): at 17:05

## 2022-09-12 RX ADMIN — Medication 1 TABLET(S): at 14:08

## 2022-09-12 RX ADMIN — Medication 75 MICROGRAM(S): at 07:21

## 2022-09-12 RX ADMIN — Medication 575 MILLIGRAM(S): at 08:06

## 2022-09-12 RX ADMIN — Medication 650 MILLIGRAM(S): at 16:35

## 2022-09-12 RX ADMIN — Medication 650 MILLIGRAM(S): at 22:53

## 2022-09-12 RX ADMIN — Medication 230 MILLIGRAM(S): at 07:22

## 2022-09-12 RX ADMIN — Medication 650 MILLIGRAM(S): at 23:20

## 2022-09-12 RX ADMIN — Medication 3 MILLIGRAM(S): at 22:53

## 2022-09-12 NOTE — PROGRESS NOTE ADULT - PROBLEM SELECTOR PLAN 1
CTH 9/8 @5PM:  1 x 2.1 x 3.9 cm hemorrhage in right occipital lobe.  CTA 9/9: demonstrates anterior venous malformation  Presented with L hemianopsia  4 hour repeat CTH - ordered and confirmed with CT dept - showed no prgression of IPH   CT venogram - to eval venous malformation , to be done with 4 hour repeat CTH - noted a right occipital lobe hematoma with surrounding vasogenic edema without significant change in size or appearance from the prior exam.   likely 2/2 trauma +/- venous malformation  neuro sx: no acute intervention per phone, repeat CT on 9/15  Pending neurology recommendation on 24 hour CT repeat and q4h neurology checks   neurology recommends  MR brain and  TTE ordered.  Strict BP control and may need  Cardene drip if elevated BP    Goals for  BP control <160/90, Na control 135-150  Neuro check q4hrs and repeat CT head 9/15   f/u Cx since pt febrile during admit

## 2022-09-12 NOTE — PROGRESS NOTE ADULT - SUBJECTIVE AND OBJECTIVE BOX
Patient is a 76y old  Female who presents with a chief complaint of brain bleed (11 Sep 2022 11:30)      SUBJECTIVE / OVERNIGHT EVENTS:    No events overnight. This AM, patient without n/v/d/cp/sob.      MEDICATIONS  (STANDING):  influenza  Vaccine (HIGH DOSE) 0.7 milliLiter(s) IntraMuscular once  levothyroxine 75 MICROGram(s) Oral daily  melatonin 3 milliGRAM(s) Oral at bedtime  multivitamin 1 Tablet(s) Oral daily    MEDICATIONS  (PRN):  acetaminophen     Tablet .. 650 milliGRAM(s) Oral every 6 hours PRN Temp greater or equal to 38C (100.4F), Mild Pain (1 - 3)  fexofenadine 180 milliGRAM(s) 1 Tablet(s) Oral two times a day PRN Hives      PHYSICAL EXAM:  T(C): 37.4 (09-12-22 @ 16:30), Max: 37.4 (09-12-22 @ 16:30)  HR: 78 (09-12-22 @ 16:30) (71 - 78)  BP: 124/56 (09-12-22 @ 16:30) (124/56 - 150/64)  RR: 18 (09-12-22 @ 16:30) (18 - 18)  SpO2: 98% (09-12-22 @ 16:30) (98% - 99%)  I&O's Summary    GENERAL: NAD, well-developed  HEAD:  Atraumatic, Normocephalic, MMM  CHEST/LUNG: No use of accessory muscles, CTAB, breathing non-labored  COR: RR, no mrcg  ABD: Soft, ND/NT, +BS  PSYCH: AAOx3  NEUROLOGY: CN II-XII grossly intact, moving all extremities  SKIN: No rashes or lesions  EXT: wwp, no cce    LABS:  CAPILLARY BLOOD GLUCOSE                              14.8   10.91 )-----------( 273      ( 12 Sep 2022 06:24 )             41.6     09-12    133<L>  |  96<L>  |  12  ----------------------------<  119<H>  3.7   |  24  |  0.63    Ca    9.6      12 Sep 2022 14:38  Phos  3.1     09-12  Mg     1.90     09-12    TPro  7.2  /  Alb  4.2  /  TBili  0.7  /  DBili  x   /  AST  21  /  ALT  18  /  AlkPhos  72  09-11                RADIOLOGY & ADDITIONAL TESTS:    Telemetry Personally Reviewed -     Imaging Personally Reviewed -     Imaging Reviewed -     Consultant(s) Notes Reviewed -       Care Discussed with Consultants/Other Providers -  Patient is a 76y old  Female who presents with a chief complaint of brain bleed (11 Sep 2022 11:30)      SUBJECTIVE / OVERNIGHT EVENTS:    No events overnight. Patient states she feels very weak and still has intermittent pain in neck and head. Daughter at bedside     MEDICATIONS  (STANDING):  influenza  Vaccine (HIGH DOSE) 0.7 milliLiter(s) IntraMuscular once  levothyroxine 75 MICROGram(s) Oral daily  melatonin 3 milliGRAM(s) Oral at bedtime  multivitamin 1 Tablet(s) Oral daily    MEDICATIONS  (PRN):  acetaminophen     Tablet .. 650 milliGRAM(s) Oral every 6 hours PRN Temp greater or equal to 38C (100.4F), Mild Pain (1 - 3)  fexofenadine 180 milliGRAM(s) 1 Tablet(s) Oral two times a day PRN Hives      PHYSICAL EXAM:  T(C): 37.4 (09-12-22 @ 16:30), Max: 37.4 (09-12-22 @ 16:30)  HR: 78 (09-12-22 @ 16:30) (71 - 78)  BP: 124/56 (09-12-22 @ 16:30) (124/56 - 150/64)  RR: 18 (09-12-22 @ 16:30) (18 - 18)  SpO2: 98% (09-12-22 @ 16:30) (98% - 99%)  I&O's Summary    GENERAL: NAD, frail  HEAD:  Atraumatic, Normocephalic, MMM  CHEST/LUNG: No use of accessory muscles, CTAB, breathing non-labored  COR: RR, no mrcg  ABD: Soft, ND/NT, +BS  PSYCH: AAOx3  NEUROLOGY: CN II-XII grossly intact, moving all extremities  SKIN: No rashes or lesions  EXT: wwp, no cce    LABS:  CAPILLARY BLOOD GLUCOSE                              14.8   10.91 )-----------( 273      ( 12 Sep 2022 06:24 )             41.6     09-12    133<L>  |  96<L>  |  12  ----------------------------<  119<H>  3.7   |  24  |  0.63    Ca    9.6      12 Sep 2022 14:38  Phos  3.1     09-12  Mg     1.90     09-12    TPro  7.2  /  Alb  4.2  /  TBili  0.7  /  DBili  x   /  AST  21  /  ALT  18  /  AlkPhos  72  09-11                RADIOLOGY & ADDITIONAL TESTS:    Consultant(s) Notes Reviewed -   Neuro    Care Discussed with Consultants/Other Providers -   Neuro

## 2022-09-12 NOTE — PROGRESS NOTE ADULT - ASSESSMENT
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      #Neuro:  ##R occipital IPH  CTH 9/8 @5PM:  1 x 2.1 x 3.9 cm hemorrhage in right occipital lobe.  CTA 9/9: c/f anterior venous malformation  P/w L hemianopsia  4 hour repeat CTH - ordered and confirmed with CT dept - showed no prgression of IPH   CT venogram - to eval venous malformation , to be done with 4 hour repeat CTH - noted a right occipital lobe hematoma with surrounding vasogenic edema without significant change in size or appearance from the prior exam.   likely 2/2 trauma +/- venous malformation  neurosx: no acute intervention per phone, repeat CT on 9/15  Pending neurology recommendation on 24 hour CT repeat and q4h neurology checks   neurology: MR brain, TTE ordered.    #Cardiovascular:  -BP  138/58   Strict BP control goal <160/90 w/ Cardene drip if needed    #Pulmonary:  -CXR clear lungs  -on RA, no active issue    #FEN/GI:  -hx of celiac dz  -patient passed dysphagia study, can begin gluten free, dairy free diet    #Renal:  -D/C fluids     #:  -pending UCx    #ID:  ##Fever  -T 100.6, WBC 13, may be reactive from IPH, no localizing symptoms  - No further fevers, WBCs downtrending   CXR clear lungs  - f/u bcx sent by  ED  - UA - No signs of infxn   - UCx ordered  Monitor off abx    #Heme:  -No chemical AC due to bleed  SCDs    #Endo:  -Hx of grave's dz, s/p radioactive iodine, now hypothyroid on synthroid  c/w home synthroid    #Ethics:  -2 children Sinan [phone]  Samantha [phone]  full code 76F PMH hypothyroidism, Celiac disease, allergies, dementia (B/l aaox2), 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

## 2022-09-12 NOTE — CHART NOTE - NSCHARTNOTEFT_GEN_A_CORE
Late Note Entry    Notified by RN that patient complains of neck pain and shoulder pain. Patient seen and examined at bedside, daughter present. Per daughter, patient had similar pain in the MICU which resolved within 30-40 minutes. Daughter states pain lasted almost 2 hours this time and is concerned. Patient asleep, no TTP to palpation noted, per daughter, patient finally fell asleep. IV tylenol ordered for pain control. Will monitor patient closely.

## 2022-09-13 DIAGNOSIS — K90.0 CELIAC DISEASE: ICD-10-CM

## 2022-09-13 DIAGNOSIS — E03.9 HYPOTHYROIDISM, UNSPECIFIED: ICD-10-CM

## 2022-09-13 DIAGNOSIS — R50.9 FEVER, UNSPECIFIED: ICD-10-CM

## 2022-09-13 DIAGNOSIS — I62.00 NONTRAUMATIC SUBDURAL HEMORRHAGE, UNSPECIFIED: ICD-10-CM

## 2022-09-13 DIAGNOSIS — F03.90 UNSPECIFIED DEMENTIA WITHOUT BEHAVIORAL DISTURBANCE: ICD-10-CM

## 2022-09-13 DIAGNOSIS — I61.9 NONTRAUMATIC INTRACEREBRAL HEMORRHAGE, UNSPECIFIED: ICD-10-CM

## 2022-09-13 LAB
ANION GAP SERPL CALC-SCNC: 10 MMOL/L — SIGNIFICANT CHANGE UP (ref 7–14)
ANION GAP SERPL CALC-SCNC: 12 MMOL/L — SIGNIFICANT CHANGE UP (ref 7–14)
BUN SERPL-MCNC: 15 MG/DL — SIGNIFICANT CHANGE UP (ref 7–23)
BUN SERPL-MCNC: 15 MG/DL — SIGNIFICANT CHANGE UP (ref 7–23)
CALCIUM SERPL-MCNC: 9.5 MG/DL — SIGNIFICANT CHANGE UP (ref 8.4–10.5)
CALCIUM SERPL-MCNC: 9.8 MG/DL — SIGNIFICANT CHANGE UP (ref 8.4–10.5)
CHLORIDE SERPL-SCNC: 100 MMOL/L — SIGNIFICANT CHANGE UP (ref 98–107)
CHLORIDE SERPL-SCNC: 98 MMOL/L — SIGNIFICANT CHANGE UP (ref 98–107)
CO2 SERPL-SCNC: 24 MMOL/L — SIGNIFICANT CHANGE UP (ref 22–31)
CO2 SERPL-SCNC: 24 MMOL/L — SIGNIFICANT CHANGE UP (ref 22–31)
CORTIS AM PEAK SERPL-MCNC: 27 UG/DL — HIGH (ref 6–18.4)
CREAT SERPL-MCNC: 0.65 MG/DL — SIGNIFICANT CHANGE UP (ref 0.5–1.3)
CREAT SERPL-MCNC: 0.65 MG/DL — SIGNIFICANT CHANGE UP (ref 0.5–1.3)
CULTURE RESULTS: SIGNIFICANT CHANGE UP
CULTURE RESULTS: SIGNIFICANT CHANGE UP
EGFR: 92 ML/MIN/1.73M2 — SIGNIFICANT CHANGE UP
EGFR: 92 ML/MIN/1.73M2 — SIGNIFICANT CHANGE UP
GLUCOSE SERPL-MCNC: 101 MG/DL — HIGH (ref 70–99)
GLUCOSE SERPL-MCNC: 95 MG/DL — SIGNIFICANT CHANGE UP (ref 70–99)
HCT VFR BLD CALC: 45.4 % — HIGH (ref 34.5–45)
HGB BLD-MCNC: 15.9 G/DL — HIGH (ref 11.5–15.5)
MAGNESIUM SERPL-MCNC: 2.2 MG/DL — SIGNIFICANT CHANGE UP (ref 1.6–2.6)
MCHC RBC-ENTMCNC: 31.8 PG — SIGNIFICANT CHANGE UP (ref 27–34)
MCHC RBC-ENTMCNC: 35 GM/DL — SIGNIFICANT CHANGE UP (ref 32–36)
MCV RBC AUTO: 90.8 FL — SIGNIFICANT CHANGE UP (ref 80–100)
NRBC # BLD: 0 /100 WBCS — SIGNIFICANT CHANGE UP (ref 0–0)
NRBC # FLD: 0 K/UL — SIGNIFICANT CHANGE UP (ref 0–0)
PHOSPHATE SERPL-MCNC: 3.6 MG/DL — SIGNIFICANT CHANGE UP (ref 2.5–4.5)
PLATELET # BLD AUTO: 318 K/UL — SIGNIFICANT CHANGE UP (ref 150–400)
POTASSIUM SERPL-MCNC: 3.9 MMOL/L — SIGNIFICANT CHANGE UP (ref 3.5–5.3)
POTASSIUM SERPL-MCNC: 4.4 MMOL/L — SIGNIFICANT CHANGE UP (ref 3.5–5.3)
POTASSIUM SERPL-SCNC: 3.9 MMOL/L — SIGNIFICANT CHANGE UP (ref 3.5–5.3)
POTASSIUM SERPL-SCNC: 4.4 MMOL/L — SIGNIFICANT CHANGE UP (ref 3.5–5.3)
RBC # BLD: 5 M/UL — SIGNIFICANT CHANGE UP (ref 3.8–5.2)
RBC # FLD: 12.1 % — SIGNIFICANT CHANGE UP (ref 10.3–14.5)
SODIUM SERPL-SCNC: 132 MMOL/L — LOW (ref 135–145)
SODIUM SERPL-SCNC: 136 MMOL/L — SIGNIFICANT CHANGE UP (ref 135–145)
SPECIMEN SOURCE: SIGNIFICANT CHANGE UP
SPECIMEN SOURCE: SIGNIFICANT CHANGE UP
T4 FREE SERPL-MCNC: 1.1 NG/DL — SIGNIFICANT CHANGE UP (ref 0.9–1.8)
TSH SERPL-MCNC: 19.6 UIU/ML — HIGH (ref 0.27–4.2)
WBC # BLD: 8.58 K/UL — SIGNIFICANT CHANGE UP (ref 3.8–10.5)
WBC # FLD AUTO: 8.58 K/UL — SIGNIFICANT CHANGE UP (ref 3.8–10.5)

## 2022-09-13 PROCEDURE — 93306 TTE W/DOPPLER COMPLETE: CPT | Mod: 26

## 2022-09-13 PROCEDURE — 99233 SBSQ HOSP IP/OBS HIGH 50: CPT

## 2022-09-13 RX ORDER — SODIUM CHLORIDE 9 MG/ML
1000 INJECTION INTRAMUSCULAR; INTRAVENOUS; SUBCUTANEOUS
Refills: 0 | Status: DISCONTINUED | OUTPATIENT
Start: 2022-09-13 | End: 2022-09-16

## 2022-09-13 RX ORDER — ENOXAPARIN SODIUM 100 MG/ML
40 INJECTION SUBCUTANEOUS EVERY 24 HOURS
Refills: 0 | Status: DISCONTINUED | OUTPATIENT
Start: 2022-09-13 | End: 2022-09-13

## 2022-09-13 RX ORDER — HEPARIN SODIUM 5000 [USP'U]/ML
5000 INJECTION INTRAVENOUS; SUBCUTANEOUS EVERY 12 HOURS
Refills: 0 | Status: DISCONTINUED | OUTPATIENT
Start: 2022-09-13 | End: 2022-09-16

## 2022-09-13 RX ORDER — MAGNESIUM SULFATE 500 MG/ML
1 VIAL (ML) INJECTION ONCE
Refills: 0 | Status: COMPLETED | OUTPATIENT
Start: 2022-09-13 | End: 2022-09-13

## 2022-09-13 RX ADMIN — Medication 650 MILLIGRAM(S): at 21:55

## 2022-09-13 RX ADMIN — Medication 650 MILLIGRAM(S): at 12:32

## 2022-09-13 RX ADMIN — Medication 650 MILLIGRAM(S): at 05:45

## 2022-09-13 RX ADMIN — Medication 100 GRAM(S): at 18:28

## 2022-09-13 RX ADMIN — Medication 650 MILLIGRAM(S): at 22:25

## 2022-09-13 RX ADMIN — Medication 650 MILLIGRAM(S): at 06:25

## 2022-09-13 RX ADMIN — Medication 3 MILLIGRAM(S): at 21:17

## 2022-09-13 RX ADMIN — Medication 1 TABLET(S): at 11:45

## 2022-09-13 RX ADMIN — SODIUM CHLORIDE 75 MILLILITER(S): 9 INJECTION INTRAMUSCULAR; INTRAVENOUS; SUBCUTANEOUS at 09:31

## 2022-09-13 RX ADMIN — Medication 75 MICROGRAM(S): at 06:42

## 2022-09-13 RX ADMIN — Medication 650 MILLIGRAM(S): at 13:00

## 2022-09-13 NOTE — PROGRESS NOTE ADULT - SUBJECTIVE AND OBJECTIVE BOX
INTERVAL HISTORY: Patient seen at bedside by stroke team & attending. No acute events overnight. Daughter at bedside. Family member over the phone. Discussed prelim MRI findings and management moving forward.      PAST MEDICAL & SURGICAL HISTORY:  H/O Graves&#x27; disease      Hypothyroidism      Breakdown (mechanical) of other urinary stents, subsequent encounter      S/P PDA repair      S/P tonsillectomy        FAMILY HISTORY:    SOCIAL HISTORY:   T/E/D:   Occupation:   Lives with:     MEDICATIONS (HOME):  Home Medications:  Synthroid 75 mcg (0.075 mg) oral tablet: 1 tab(s) orally once a day (08 Sep 2022 22:18)    MEDICATIONS  (STANDING):  influenza  Vaccine (HIGH DOSE) 0.7 milliLiter(s) IntraMuscular once  levothyroxine 75 MICROGram(s) Oral daily  melatonin 3 milliGRAM(s) Oral at bedtime  multivitamin 1 Tablet(s) Oral daily  sodium chloride 0.9%. 1000 milliLiter(s) (75 mL/Hr) IV Continuous <Continuous>    MEDICATIONS  (PRN):  acetaminophen     Tablet .. 650 milliGRAM(s) Oral every 6 hours PRN Temp greater or equal to 38C (100.4F), Mild Pain (1 - 3)  fexofenadine 180 milliGRAM(s) 1 Tablet(s) Oral two times a day PRN Hives    ALLERGIES/INTOLERANCES:  Allergies  &quot;cholesterol medications&quot;  gets &quot;jaundiced&quot; (Unknown)  dairy products (Unknown)  Gluten (Diarrhea)  Grapes (Hives)  ibuprofen (Other)  Pineapple (Hives)    Intolerances    VITALS & EXAMINATION:  Vital Signs Last 24 Hrs  T(C): 36.9 (13 Sep 2022 05:15), Max: 37.4 (12 Sep 2022 16:30)  T(F): 98.5 (13 Sep 2022 05:15), Max: 99.4 (12 Sep 2022 16:30)  HR: 80 (13 Sep 2022 05:15) (73 - 84)  BP: 109/60 (13 Sep 2022 05:15) (109/60 - 140/67)  BP(mean): --  RR: 18 (13 Sep 2022 05:15) (17 - 18)  SpO2: 99% (13 Sep 2022 05:15) (98% - 99%)    Parameters below as of 13 Sep 2022 05:15  Patient On (Oxygen Delivery Method): room air        General:  Constitutional: Female, appears stated age, in no apparent distress including pain  Head: Normocephalic & Atraumatic.    Neurological:  MS: Eyes closed, open to voice. Awake, alert, oriented to person, place, situation, time. Follows all commands.    Language: Speech is clear, fluent with good repetition.    CNs: Left homonymous hemianopia. EOMI no nystagmus. V1-3 intact to LT b/l. No facial asymmetry b/l, full eye closure strength b/l.    Motor: Normal muscle bulk & tone. No noticeable tremor. No drift of UE or LE b/l.     Sensation: Intact to LT b/l throughout.     Cortical: Extinction on DSS (neglect): none    Coordination: No dysmetria to FTN b/l.     Gait: Deferred.      LABORATORY:  CBC                       15.9   8.58  )-----------( 318      ( 13 Sep 2022 05:20 )             45.4     Chem 09-13    132<L>  |  98  |  15  ----------------------------<  95  4.4   |  24  |  0.65    Ca    9.8      13 Sep 2022 05:20  Phos  3.6     09-13  Mg     2.20     09-13      LFTs   Coagulopathy   Lipid Panel   A1c   Cardiac enzymes     U/A   CSF  Immunological  Other    STUDIES & IMAGING:    Radiology (XR, CT, MR, U/S, TTE/MARCY):    MRI brain w/o contrast: To my eye, L occipital lobe intraparenchymal hemorrhage which shows some enhancement. Pending official read.     9/8 @ 20:55    Brain CT:    The ventricles and sulci are unchanged. Again noted is a right occipital   lobe hematoma with surrounding vasogenic edema without significant change   in size or appearance from the prior exam. Compression of the right   occipitalhorn as well as a small amount of intraventricular hemorrhage   is again seen. There is no midline shift. Linear areas of hyperdensity   are seen in the region of the hematoma which may represent trace   subarachnoid blood or residual contrast from recent CT angiography.    Brain CTV:  SUPERIOR SAGITTAL SINUS: Patent.  RIGHT TRANSVERSE AND SIGMOID SINUS: Patent.  LEFT TRANSVERSE AND SIGMOID SINUS: Patent.  INTERNAL JUGULAR VEINS: Patent.  INTERNAL CEREBRAL VEINS: Patent.  VEIN OF ROSEMARY: Patent.  STRAIGHT SINUS: Patent.    Slight increased vascularity is again seen in the region of the hematoma   as discussed previously.    IMPRESSION: No evidence of venous sinus thrombosis.    Stable right occipital lobe intraparenchymal hematoma.    Scattered linear foci of hyperdensity adjacent to the hematoma which may   represent trace subarachnoid hemorrhage or residual contrast from recent   CT angiography.    --- End of Report ---          CT Brain Stroke Protocol (09.08.22 @ 17:02)  IMPRESSION:  Acute intracranial hemorrhage within the right occipital lobe with   surrounding edema and extension of hemorrhage into the right lateral   ventricle. Mild mass effect on the right lateral ventricle. No midline   shift.    Please see separately dictated report for CTA neck/brain findings.    CT Angio Neck Stroke Protocol w/ IV Cont (09.08.22 @ 17:02)  IMPRESSION: Normal CTA of the neck. There is slightly increased   vascularity in the right occipital lobe medial to the parenchymal   hemorrhage suspicious for an anterior venous malformation.

## 2022-09-13 NOTE — PROGRESS NOTE ADULT - NS ATTEND AMEND GEN_ALL_CORE FT
agree with above  f/u nsx re possible angio   holding AP/AC  outpatient f/u   Freddie Helms MD  Vascular Neurology

## 2022-09-13 NOTE — PROGRESS NOTE ADULT - ASSESSMENT
76 year old RH female with a past medical history of dementia, pediatric heart surgery for patent ductus arteriosus, hypothyroidism presenting for L sided visual loss and increasing confusion. CTH showing R occipital lobe IPH, CTA H/N with possible R AVM, CTV re demonstrates increased vascularity & possible SAH. MRI brain w/w/o contrast pending read but grossly shows R occipital lobe IPH w/o other areas of hemorrhage/microhemorrhage. Exam as above.     Impression: LHH secondary to R occipital lobe intraparenchymal hemorrhage with unclear mechanism at this time; chronic HTN vs AVM vs other underlying lesion.     Recommendations     [] rMRI brain w/w/o contrast in 2 months  [] Further imaging as per Neurosurgery (rCTA H/N w/ IV contrast, Conventional Angiogram determination)  [] Avoid all antiplatelets at this time  [] Can start on chemical DVT prophylaxis  [] BP < 160/90 mmHg   [] PT/OT - outpatient PT  [] C/w regular diet as tolerated  [] Neurosurgery follow up for potential further workup and/or intervention. Appreciate recommendations.  [] Rest of workup as per primary team.   [] Patient should follow up with Stroke NP, Kortney Denson or Bhargavi Dale, in clinic at [address] [place] [address]. Please email [email or web address] w/ basic PHI.     From neurovascular standpoint, rest of workup as per Neurosurgery. Signing off, please reconsult PRN.     Patient case discussed and seen with stroke fellow, Dr. Millard, and stroke attending, Dr. Helms.     Please call with questions: [phone]    76 year old RH female with a past medical history of dementia, pediatric heart surgery for patent ductus arteriosus, hypothyroidism presenting for L sided visual loss and increasing confusion. CTH showing R occipital lobe IPH, CTA H/N with possible R AVM, CTV re demonstrates increased vascularity & possible SAH. MRI brain w/w/o contrast pending read but grossly shows R occipital lobe IPH w/o other areas of hemorrhage/microhemorrhage. Exam as above.     Impression: LHH secondary to R occipital lobe intraparenchymal hemorrhage with unclear mechanism at this time; chronic HTN vs AVM vs other underlying lesion.     Recommendations     [] rMRI brain w/w/o contrast in 2 months  [] Further imaging as per Neurosurgery (rCTA H/N w/ IV contrast, Conventional Angiogram determination) as she may benefit   [] Avoid all antiplatelets at this time  [] Can start on chemical DVT prophylaxis  [] BP < 160/90 mmHg   [] PT/OT - outpatient PT  [] C/w regular diet as tolerated  [] Neurosurgery follow up for potential further workup and/or intervention. Appreciate recommendations.  [] Rest of workup as per primary team.   [] Patient should follow up with Stroke NP, Kortney Denson or Bhargavi Dale, in clinic at [address] [place] [address]. Please email [email or web address] w/ basic PHI. for more urgent fu she can f/u with Dr. Helms at [phone]    From neurovascular standpoint, rest of workup as per Neurosurgery. Signing off, please reconsult PRN.     Patient case discussed and seen with stroke fellow, Dr. Millard, and stroke attending, Dr. Helms.     Please call with questions: [phone]    76 year old RH female with a past medical history of dementia, pediatric heart surgery for patent ductus arteriosus, hypothyroidism presenting for L sided visual loss and increasing confusion. CTH showing R occipital lobe IPH, CTA H/N with possible R AVM, CTV re demonstrates increased vascularity & possible SAH. MRI brain w/w/o contrast pending read but grossly shows R occipital lobe IPH w/o other areas of hemorrhage/microhemorrhage. Exam as above.     Impression: LHH secondary to R occipital lobe intraparenchymal hemorrhage with unclear mechanism at this time; chronic HTN vs AVM vs other underlying lesion.     Recommendations     [] rMRI brain w/w/o contrast in 2 months  [] Further imaging as per Neurosurgery (rCTA H/N w/ IV contrast, Conventional Angiogram determination) as she may benefit   [] Avoid all antiplatelets at this time  [] Can start on chemical DVT prophylaxis  [] Tylenol 1g IV, MgSO4 1g IV for HA management   [] BP < 160/90 mmHg   [] PT/OT - outpatient PT  [] C/w regular diet as tolerated  [] Neurosurgery follow up for potential further workup and/or intervention. Appreciate recommendations.  [] Rest of workup as per primary team.   [] Patient should follow up with Stroke NP, Kortney Denson or Bhargavi Dale, in clinic at [address] [place] [address]. Please email [email or web address] w/ basic PHI. for more urgent fu she can f/u with Dr. Helms at [phone]    From neurovascular standpoint, rest of workup as per Neurosurgery. Signing off, please reconsult PRN.     Patient case discussed and seen with stroke fellow, Dr. Millard, and stroke attending, Dr. Helms.     Please call with questions: [phone]

## 2022-09-13 NOTE — PROGRESS NOTE ADULT - SUBJECTIVE AND OBJECTIVE BOX
Patient is a 76y old  Female who presents with a chief complaint of brain bleed (12 Sep 2022 14:00)      SUBJECTIVE / OVERNIGHT EVENTS:    No events overnight. This AM, patient without n/v/d/cp/sob.      MEDICATIONS  (STANDING):  influenza  Vaccine (HIGH DOSE) 0.7 milliLiter(s) IntraMuscular once  levothyroxine 75 MICROGram(s) Oral daily  melatonin 3 milliGRAM(s) Oral at bedtime  multivitamin 1 Tablet(s) Oral daily  sodium chloride 0.9%. 1000 milliLiter(s) (75 mL/Hr) IV Continuous <Continuous>    MEDICATIONS  (PRN):  acetaminophen     Tablet .. 650 milliGRAM(s) Oral every 6 hours PRN Temp greater or equal to 38C (100.4F), Mild Pain (1 - 3)  fexofenadine 180 milliGRAM(s) 1 Tablet(s) Oral two times a day PRN Hives      PHYSICAL EXAM:  T(C): 36.9 (09-13-22 @ 05:15), Max: 37.4 (09-12-22 @ 16:30)  HR: 80 (09-13-22 @ 05:15) (73 - 84)  BP: 109/60 (09-13-22 @ 05:15) (109/60 - 140/67)  RR: 18 (09-13-22 @ 05:15) (17 - 18)  SpO2: 99% (09-13-22 @ 05:15) (98% - 99%)  I&O's Summary    GENERAL: NAD, well-developed  HEAD:  Atraumatic, Normocephalic, MMM  CHEST/LUNG: No use of accessory muscles, CTAB, breathing non-labored  COR: RR, no mrcg  ABD: Soft, ND/NT, +BS  PSYCH: AAOx3  NEUROLOGY: CN II-XII grossly intact, moving all extremities  SKIN: No rashes or lesions  EXT: wwp, no cce    LABS:  CAPILLARY BLOOD GLUCOSE                              15.9   8.58  )-----------( 318      ( 13 Sep 2022 05:20 )             45.4     09-13    132<L>  |  98  |  15  ----------------------------<  95  4.4   |  24  |  0.65    Ca    9.8      13 Sep 2022 05:20  Phos  3.6     09-13  Mg     2.20     09-13                  RADIOLOGY & ADDITIONAL TESTS:    Telemetry Personally Reviewed -     Imaging Personally Reviewed -     Imaging Reviewed -     Consultant(s) Notes Reviewed -       Care Discussed with Consultants/Other Providers -

## 2022-09-13 NOTE — PROGRESS NOTE ADULT - ASSESSMENT
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      #Neuro:  ##R occipital IPH  CTH 9/8 @5PM:  1 x 2.1 x 3.9 cm hemorrhage in right occipital lobe.  CTA 9/9: c/f anterior venous malformation  P/w L hemianopsia  4 hour repeat CTH - ordered and confirmed with CT dept - showed no prgression of IPH   CT venogram - to eval venous malformation , to be done with 4 hour repeat CTH - noted a right occipital lobe hematoma with surrounding vasogenic edema without significant change in size or appearance from the prior exam.   likely 2/2 trauma +/- venous malformation  neurosx: no acute intervention per phone, repeat CT on 9/15  Pending neurology recommendation on 24 hour CT repeat and q4h neurology checks   neurology: MR brain, TTE ordered.    #Cardiovascular:  -BP  138/58   Strict BP control goal <160/90 w/ Cardene drip if needed    #Pulmonary:  -CXR clear lungs  -on RA, no active issue    #FEN/GI:  -hx of celiac dz  -patient passed dysphagia study, can begin gluten free, dairy free diet    #Renal:  -D/C fluids     #:  -pending UCx    #ID:  ##Fever  -T 100.6, WBC 13, may be reactive from IPH, no localizing symptoms  - No further fevers, WBCs downtrending   CXR clear lungs  - f/u bcx sent by  ED  - UA - No signs of infxn   - UCx ordered  Monitor off abx    #Heme:  -No chemical AC due to bleed  SCDs    #Endo:  -Hx of grave's dz, s/p radioactive iodine, now hypothyroid on synthroid  c/w home synthroid    #Ethics:  -2 children Sinan [phone]  Samantha [phone]  full code 76F PMH hypothyroidism, Celiac disease, allergies, dementia (B/l aaox2), 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

## 2022-09-14 LAB
ANION GAP SERPL CALC-SCNC: 10 MMOL/L — SIGNIFICANT CHANGE UP (ref 7–14)
BUN SERPL-MCNC: 12 MG/DL — SIGNIFICANT CHANGE UP (ref 7–23)
CALCIUM SERPL-MCNC: 9 MG/DL — SIGNIFICANT CHANGE UP (ref 8.4–10.5)
CHLORIDE SERPL-SCNC: 104 MMOL/L — SIGNIFICANT CHANGE UP (ref 98–107)
CO2 SERPL-SCNC: 22 MMOL/L — SIGNIFICANT CHANGE UP (ref 22–31)
CORTIS AM PEAK SERPL-MCNC: 21.2 UG/DL — HIGH (ref 6–18.4)
CREAT SERPL-MCNC: 0.56 MG/DL — SIGNIFICANT CHANGE UP (ref 0.5–1.3)
EGFR: 96 ML/MIN/1.73M2 — SIGNIFICANT CHANGE UP
GLUCOSE SERPL-MCNC: 92 MG/DL — SIGNIFICANT CHANGE UP (ref 70–99)
HCT VFR BLD CALC: 42 % — SIGNIFICANT CHANGE UP (ref 34.5–45)
HGB BLD-MCNC: 14.6 G/DL — SIGNIFICANT CHANGE UP (ref 11.5–15.5)
MAGNESIUM SERPL-MCNC: 2.2 MG/DL — SIGNIFICANT CHANGE UP (ref 1.6–2.6)
MCHC RBC-ENTMCNC: 32.2 PG — SIGNIFICANT CHANGE UP (ref 27–34)
MCHC RBC-ENTMCNC: 34.8 GM/DL — SIGNIFICANT CHANGE UP (ref 32–36)
MCV RBC AUTO: 92.5 FL — SIGNIFICANT CHANGE UP (ref 80–100)
NRBC # BLD: 0 /100 WBCS — SIGNIFICANT CHANGE UP (ref 0–0)
NRBC # FLD: 0 K/UL — SIGNIFICANT CHANGE UP (ref 0–0)
PHOSPHATE SERPL-MCNC: 3 MG/DL — SIGNIFICANT CHANGE UP (ref 2.5–4.5)
PLATELET # BLD AUTO: 295 K/UL — SIGNIFICANT CHANGE UP (ref 150–400)
POTASSIUM SERPL-MCNC: 4.1 MMOL/L — SIGNIFICANT CHANGE UP (ref 3.5–5.3)
POTASSIUM SERPL-SCNC: 4.1 MMOL/L — SIGNIFICANT CHANGE UP (ref 3.5–5.3)
RBC # BLD: 4.54 M/UL — SIGNIFICANT CHANGE UP (ref 3.8–5.2)
RBC # FLD: 12.2 % — SIGNIFICANT CHANGE UP (ref 10.3–14.5)
SODIUM SERPL-SCNC: 136 MMOL/L — SIGNIFICANT CHANGE UP (ref 135–145)
VIT B12 SERPL-MCNC: 2000 PG/ML — HIGH (ref 200–900)
WBC # BLD: 7.86 K/UL — SIGNIFICANT CHANGE UP (ref 3.8–10.5)
WBC # FLD AUTO: 7.86 K/UL — SIGNIFICANT CHANGE UP (ref 3.8–10.5)

## 2022-09-14 PROCEDURE — 99233 SBSQ HOSP IP/OBS HIGH 50: CPT | Mod: FS

## 2022-09-14 RX ADMIN — Medication 650 MILLIGRAM(S): at 18:30

## 2022-09-14 RX ADMIN — Medication 650 MILLIGRAM(S): at 12:00

## 2022-09-14 RX ADMIN — Medication 75 MICROGRAM(S): at 06:35

## 2022-09-14 RX ADMIN — Medication 3 MILLIGRAM(S): at 21:10

## 2022-09-14 RX ADMIN — Medication 1 TABLET(S): at 12:12

## 2022-09-14 RX ADMIN — Medication 650 MILLIGRAM(S): at 19:02

## 2022-09-14 NOTE — ED PROVIDER NOTE - NS ED MD DISPO DIVISION
LEVOTHYROXINE 0.075MG (75MCG) TABS, Refill Protocol Passed,  Refills sent through next follow-up       ROSALEE

## 2022-09-14 NOTE — PROGRESS NOTE ADULT - SUBJECTIVE AND OBJECTIVE BOX
Patient is a 76y old  Female who presents with a chief complaint of brain bleed (13 Sep 2022 09:24)      SUBJECTIVE / OVERNIGHT EVENTS:    No events overnight. This AM, patient without n/v/d/cp/sob.      MEDICATIONS  (STANDING):  heparin   Injectable 5000 Unit(s) SubCutaneous every 12 hours  influenza  Vaccine (HIGH DOSE) 0.7 milliLiter(s) IntraMuscular once  levothyroxine 75 MICROGram(s) Oral daily  melatonin 3 milliGRAM(s) Oral at bedtime  multivitamin 1 Tablet(s) Oral daily  sodium chloride 0.9%. 1000 milliLiter(s) (75 mL/Hr) IV Continuous <Continuous>    MEDICATIONS  (PRN):  acetaminophen     Tablet .. 650 milliGRAM(s) Oral every 6 hours PRN Temp greater or equal to 38C (100.4F), Mild Pain (1 - 3)  fexofenadine 180 milliGRAM(s) 1 Tablet(s) Oral two times a day PRN Hives      PHYSICAL EXAM:  T(C): 36.8 (09-14-22 @ 06:05), Max: 36.9 (09-13-22 @ 12:39)  HR: 76 (09-14-22 @ 06:05) (75 - 76)  BP: 136/62 (09-14-22 @ 06:05) (115/68 - 136/62)  RR: 18 (09-14-22 @ 06:05) (18 - 18)  SpO2: 99% (09-14-22 @ 06:05) (99% - 100%)  I&O's Summary    GENERAL: NAD, well-developed  HEAD:  Atraumatic, Normocephalic, MMM  CHEST/LUNG: No use of accessory muscles, CTAB, breathing non-labored  COR: RR, no mrcg  ABD: Soft, ND/NT, +BS  PSYCH: AAOx3  NEUROLOGY: CN II-XII grossly intact, moving all extremities  SKIN: No rashes or lesions  EXT: wwp, no cce    LABS:  CAPILLARY BLOOD GLUCOSE                              14.6   7.86  )-----------( 295      ( 14 Sep 2022 07:00 )             42.0     09-14    136  |  104  |  12  ----------------------------<  92  4.1   |  22  |  0.56    Ca    9.0      14 Sep 2022 07:00  Phos  3.0     09-14  Mg     2.20     09-14                  RADIOLOGY & ADDITIONAL TESTS:    Imaging Personally Reviewed -     Imaging Reviewed -     Consultant(s) Notes Reviewed -       Care Discussed with Consultants/Other Providers -  Patient is a 76y old  Female who presents with a chief complaint of brain bleed (13 Sep 2022 09:24)      SUBJECTIVE / OVERNIGHT EVENTS:    No events overnight. This AM, patient without n/v/d/cp/sob.  She does complain of intermittent headaches improved with Tylenol Would like to see nutritionist and get records for outpt nutritionist to target her vitamin suppplements.     MEDICATIONS  (STANDING):  heparin   Injectable 5000 Unit(s) SubCutaneous every 12 hours  influenza  Vaccine (HIGH DOSE) 0.7 milliLiter(s) IntraMuscular once  levothyroxine 75 MICROGram(s) Oral daily  melatonin 3 milliGRAM(s) Oral at bedtime  multivitamin 1 Tablet(s) Oral daily  sodium chloride 0.9%. 1000 milliLiter(s) (75 mL/Hr) IV Continuous <Continuous>    MEDICATIONS  (PRN):  acetaminophen     Tablet .. 650 milliGRAM(s) Oral every 6 hours PRN Temp greater or equal to 38C (100.4F), Mild Pain (1 - 3)  fexofenadine 180 milliGRAM(s) 1 Tablet(s) Oral two times a day PRN Hives      PHYSICAL EXAM:  T(C): 36.8 (09-14-22 @ 06:05), Max: 36.9 (09-13-22 @ 12:39)  HR: 76 (09-14-22 @ 06:05) (75 - 76)  BP: 136/62 (09-14-22 @ 06:05) (115/68 - 136/62)  RR: 18 (09-14-22 @ 06:05) (18 - 18)  SpO2: 99% (09-14-22 @ 06:05) (99% - 100%)  I&O's Summary    GENERAL: NAD, frail   HEAD:  Atraumatic, Normocephalic, MMM  CHEST/LUNG: No use of accessory muscles, CTAB, breathing non-labored  COR: RR, no mrcg  ABD: Soft, ND/NT, +BS  PSYCH: AAOx3  NEUROLOGY: CN II-XII grossly intact, moving all extremities  SKIN: No rashes or lesions  EXT: no LE edema, no cyanosis noted     LABS:  CAPILLARY BLOOD GLUCOSE                              14.6   7.86  )-----------( 295      ( 14 Sep 2022 07:00 )             42.0     09-14    136  |  104  |  12  ----------------------------<  92  4.1   |  22  |  0.56    Ca    9.0      14 Sep 2022 07:00  Phos  3.0     09-14  Mg     2.20     09-14                  RADIOLOGY & ADDITIONAL TESTS:      Imaging Reviewed -     Consultant(s) Notes Reviewed -       Care Discussed with Consultants/Other Providers -

## 2022-09-14 NOTE — PROGRESS NOTE ADULT - PROBLEM SELECTOR PLAN 1
CTH 9/8 @5PM:  1 x 2.1 x 3.9 cm hemorrhage in right occipital lobe.  CTA 9/9: demonstrates anterior venous malformation  Presented with L hemianopsia  4 hour repeat CTH - ordered and confirmed with CT dept - showed no prgression of IPH   CT venogram - to eval venous malformation , to be done with 4 hour repeat CTH - noted a right occipital lobe hematoma with surrounding vasogenic edema without significant change in size or appearance from the prior exam.   likely 2/2 trauma +/- venous malformation  neuro sx: no acute intervention per phone, repeat CT on 9/15  Pending neurology recommendation on 24 hour CT repeat and q4h neurology checks   neurology recommends  MR brain and  TTE ordered.  Strict BP control and may need  Cardene drip if elevated BP    Goals for  BP control <160/90, Na control 135-150  Neuro check q4hrs and repeat CT head 9/15   f/u Cx since pt febrile during admit CTH 9/8 @5PM:  1 x 2.1 x 3.9 cm hemorrhage in right occipital lobe.  CTA 9/9: demonstrates anterior venous malformation  Presented with L hemianopsia  4 hour repeat CTH - ordered and confirmed with CT dept - showed no prgression of IPH   CT venogram - to eval venous malformation , to be done with 4 hour repeat CTH - noted a right occipital lobe hematoma with surrounding vasogenic edema without significant change in size or appearance from the prior exam.   likely 2/2 trauma +/- venous malformation  neuro sx: no acute intervention per phone, repeat CT on 9/15  Pending neurology recommendation on 24 hour CT repeat and q4h neurology checks   neurology recommends  MR brain and  TTE ordered.  Strict BP control and may need  Cardene drip if elevated BP    Goals for  BP control <160/90, Na control 135-150  Neuro check q4hrs and repeat CT head 9/15. CTA pending    f/u Cx since pt febrile during admit

## 2022-09-14 NOTE — PROGRESS NOTE ADULT - ASSESSMENT
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

## 2022-09-15 LAB
ANION GAP SERPL CALC-SCNC: 9 MMOL/L — SIGNIFICANT CHANGE UP (ref 7–14)
BUN SERPL-MCNC: 16 MG/DL — SIGNIFICANT CHANGE UP (ref 7–23)
CALCIUM SERPL-MCNC: 9.5 MG/DL — SIGNIFICANT CHANGE UP (ref 8.4–10.5)
CHLORIDE SERPL-SCNC: 104 MMOL/L — SIGNIFICANT CHANGE UP (ref 98–107)
CO2 SERPL-SCNC: 26 MMOL/L — SIGNIFICANT CHANGE UP (ref 22–31)
CREAT SERPL-MCNC: 0.69 MG/DL — SIGNIFICANT CHANGE UP (ref 0.5–1.3)
EGFR: 91 ML/MIN/1.73M2 — SIGNIFICANT CHANGE UP
GLUCOSE SERPL-MCNC: 87 MG/DL — SIGNIFICANT CHANGE UP (ref 70–99)
HCT VFR BLD CALC: 41.9 % — SIGNIFICANT CHANGE UP (ref 34.5–45)
HGB BLD-MCNC: 14.8 G/DL — SIGNIFICANT CHANGE UP (ref 11.5–15.5)
MAGNESIUM SERPL-MCNC: 2.3 MG/DL — SIGNIFICANT CHANGE UP (ref 1.6–2.6)
MCHC RBC-ENTMCNC: 32.6 PG — SIGNIFICANT CHANGE UP (ref 27–34)
MCHC RBC-ENTMCNC: 35.3 GM/DL — SIGNIFICANT CHANGE UP (ref 32–36)
MCV RBC AUTO: 92.3 FL — SIGNIFICANT CHANGE UP (ref 80–100)
NRBC # BLD: 0 /100 WBCS — SIGNIFICANT CHANGE UP (ref 0–0)
NRBC # FLD: 0 K/UL — SIGNIFICANT CHANGE UP (ref 0–0)
PHOSPHATE SERPL-MCNC: 3.1 MG/DL — SIGNIFICANT CHANGE UP (ref 2.5–4.5)
PLATELET # BLD AUTO: 324 K/UL — SIGNIFICANT CHANGE UP (ref 150–400)
POTASSIUM SERPL-MCNC: 4.3 MMOL/L — SIGNIFICANT CHANGE UP (ref 3.5–5.3)
POTASSIUM SERPL-SCNC: 4.3 MMOL/L — SIGNIFICANT CHANGE UP (ref 3.5–5.3)
RBC # BLD: 4.54 M/UL — SIGNIFICANT CHANGE UP (ref 3.8–5.2)
RBC # FLD: 12.4 % — SIGNIFICANT CHANGE UP (ref 10.3–14.5)
SARS-COV-2 RNA SPEC QL NAA+PROBE: SIGNIFICANT CHANGE UP
SODIUM SERPL-SCNC: 139 MMOL/L — SIGNIFICANT CHANGE UP (ref 135–145)
T3 SERPL-MCNC: 46 NG/DL — LOW (ref 80–200)
T4 FREE SERPL-MCNC: 1.1 NG/DL — SIGNIFICANT CHANGE UP (ref 0.9–1.8)
TSH SERPL-MCNC: 21.12 UIU/ML — HIGH (ref 0.27–4.2)
WBC # BLD: 7.42 K/UL — SIGNIFICANT CHANGE UP (ref 3.8–10.5)
WBC # FLD AUTO: 7.42 K/UL — SIGNIFICANT CHANGE UP (ref 3.8–10.5)

## 2022-09-15 PROCEDURE — 99233 SBSQ HOSP IP/OBS HIGH 50: CPT

## 2022-09-15 PROCEDURE — 70496 CT ANGIOGRAPHY HEAD: CPT | Mod: 26

## 2022-09-15 RX ORDER — LEVOTHYROXINE SODIUM 125 MCG
88 TABLET ORAL DAILY
Refills: 0 | Status: DISCONTINUED | OUTPATIENT
Start: 2022-09-15 | End: 2022-09-16

## 2022-09-15 RX ADMIN — Medication 650 MILLIGRAM(S): at 23:03

## 2022-09-15 RX ADMIN — Medication 650 MILLIGRAM(S): at 01:01

## 2022-09-15 RX ADMIN — Medication 75 MICROGRAM(S): at 06:39

## 2022-09-15 RX ADMIN — Medication 650 MILLIGRAM(S): at 23:51

## 2022-09-15 RX ADMIN — Medication 3 MILLIGRAM(S): at 22:07

## 2022-09-15 RX ADMIN — Medication 1 TABLET(S): at 12:29

## 2022-09-15 RX ADMIN — Medication 650 MILLIGRAM(S): at 01:31

## 2022-09-15 NOTE — PROGRESS NOTE ADULT - NSPROGADDITIONALINFOA_GEN_ALL_CORE
DVT ppx: no chemical AC due to bleed. SCDs for now  -2 children Sinan [phone]  Samantha [phone]  full code      Discussed plan with patient and daughter on 9/11. All questions answered
Updated daughter, Samantha Finney, at bedside and answered all questions. In agreement with plan

## 2022-09-15 NOTE — PROGRESS NOTE ADULT - PROBLEM SELECTOR PROBLEM 1
Acute cerebral hemorrhage

## 2022-09-15 NOTE — PROGRESS NOTE ADULT - PROBLEM SELECTOR PLAN 1
CTH 9/8 @5PM:  1 x 2.1 x 3.9 cm hemorrhage in right occipital lobe.  CTA 9/9: demonstrates anterior venous malformation  Presented with L hemianopsia  4 hour repeat CTH - ordered and confirmed with CT dept - showed no prgression of IPH   CT venogram - to eval venous malformation , to be done with 4 hour repeat CTH - noted a right occipital lobe hematoma with surrounding vasogenic edema without significant change in size or appearance from the prior exam.   likely 2/2 trauma +/- venous malformation  neuro sx: no acute intervention per phone, repeat CT on 9/15  Pending neurology recommendation on 24 hour CT repeat and q4h neurology checks   neurology recommends  MR brain and  TTE ordered.  Strict BP control and may need  Cardene drip if elevated BP    Goals for  BP control <160/90, Na control 135-150  Neuro check q4hrs and repeat CT head 9/15. CTA pending    f/u Cx since pt febrile during admit CTH 9/8 @5PM:  1 x 2.1 x 3.9 cm hemorrhage in right occipital lobe.  CTA 9/9: demonstrates anterior venous malformation  Presented with L hemianopsia  4 hour repeat CTH - ordered and confirmed with CT dept - showed no prgression of IPH   CT venogram - to eval venous malformation , to be done with 4 hour repeat CTH - noted a right occipital lobe hematoma with surrounding vasogenic edema without significant change in size or appearance from the prior exam.   likely 2/2 trauma +/- venous malformation  neuro sx: no acute intervention per phone, repeat CT on 9/15  Pending neurology recommendation on 24 hour CT repeat and q4h neurology checks   neurology recommends  MR brain and  TTE ordered.  Strict BP control and may need  Cardene drip if elevated BP    Goals for  BP control <160/90, Na control 135-150  Neuro check q4hrs and repeat CT head 9/15. CTA pending and Neurosurg recs pending    f/u Cx since pt febrile during admit

## 2022-09-15 NOTE — PROGRESS NOTE ADULT - PROBLEM SELECTOR PLAN 2
repeat TSH and Free T4  Continue home synthroid Elevated TSH and  with wnl Free T4  Continue home synthroid and will increase to 88 and repeat testing in few weeks

## 2022-09-15 NOTE — PROGRESS NOTE ADULT - SUBJECTIVE AND OBJECTIVE BOX
Patient is a 76y old  Female who presents with a chief complaint of brain bleed (14 Sep 2022 09:35)      SUBJECTIVE / OVERNIGHT EVENTS:    No events overnight. This AM, patient without n/v/d/cp/sob.      MEDICATIONS  (STANDING):  heparin   Injectable 5000 Unit(s) SubCutaneous every 12 hours  influenza  Vaccine (HIGH DOSE) 0.7 milliLiter(s) IntraMuscular once  levothyroxine 75 MICROGram(s) Oral daily  melatonin 3 milliGRAM(s) Oral at bedtime  multivitamin 1 Tablet(s) Oral daily  sodium chloride 0.9%. 1000 milliLiter(s) (75 mL/Hr) IV Continuous <Continuous>    MEDICATIONS  (PRN):  acetaminophen     Tablet .. 650 milliGRAM(s) Oral every 6 hours PRN Temp greater or equal to 38C (100.4F), Mild Pain (1 - 3)  fexofenadine 180 milliGRAM(s) 1 Tablet(s) Oral two times a day PRN Hives      PHYSICAL EXAM:  T(C): 36.6 (09-15-22 @ 08:15), Max: 36.8 (09-14-22 @ 21:27)  HR: 75 (09-15-22 @ 08:15) (70 - 92)  BP: 112/60 (09-15-22 @ 08:15) (108/51 - 125/51)  RR: 18 (09-15-22 @ 08:15) (18 - 19)  SpO2: 96% (09-15-22 @ 08:15) (96% - 99%)  I&O's Summary    GENERAL: NAD, well-developed  HEAD:  Atraumatic, Normocephalic, MMM  CHEST/LUNG: No use of accessory muscles, CTAB, breathing non-labored  COR: RR, no mrcg  ABD: Soft, ND/NT, +BS  PSYCH: AAOx3  NEUROLOGY: CN II-XII grossly intact, moving all extremities  SKIN: No rashes or lesions  EXT: wwp, no cce    LABS:  CAPILLARY BLOOD GLUCOSE                              14.8   7.42  )-----------( 324      ( 15 Sep 2022 07:23 )             41.9     09-15    139  |  104  |  16  ----------------------------<  87  4.3   |  26  |  0.69    Ca    9.5      15 Sep 2022 07:23  Phos  3.1     09-15  Mg     2.30     09-15                  RADIOLOGY & ADDITIONAL TESTS:    Telemetry Personally Reviewed -     Imaging Personally Reviewed -     Imaging Reviewed -     Consultant(s) Notes Reviewed -       Care Discussed with Consultants/Other Providers -  Patient is a 76y old  Female who presents with a chief complaint of brain bleed (14 Sep 2022 09:35)      SUBJECTIVE / OVERNIGHT EVENTS:    No events overnight. This AM, patient without n/v/d/cp/sob.  She reports feeling better and intermittently has headache.     MEDICATIONS  (STANDING):  heparin   Injectable 5000 Unit(s) SubCutaneous every 12 hours  influenza  Vaccine (HIGH DOSE) 0.7 milliLiter(s) IntraMuscular once  levothyroxine 75 MICROGram(s) Oral daily  melatonin 3 milliGRAM(s) Oral at bedtime  multivitamin 1 Tablet(s) Oral daily  sodium chloride 0.9%. 1000 milliLiter(s) (75 mL/Hr) IV Continuous <Continuous>    MEDICATIONS  (PRN):  acetaminophen     Tablet .. 650 milliGRAM(s) Oral every 6 hours PRN Temp greater or equal to 38C (100.4F), Mild Pain (1 - 3)  fexofenadine 180 milliGRAM(s) 1 Tablet(s) Oral two times a day PRN Hives      PHYSICAL EXAM:  T(C): 36.6 (09-15-22 @ 08:15), Max: 36.8 (09-14-22 @ 21:27)  HR: 75 (09-15-22 @ 08:15) (70 - 92)  BP: 112/60 (09-15-22 @ 08:15) (108/51 - 125/51)  RR: 18 (09-15-22 @ 08:15) (18 - 19)  SpO2: 96% (09-15-22 @ 08:15) (96% - 99%)  I&O's Summary    GENERAL: NAD, frail  HEAD:  Atraumatic, Normocephalic, MMM  CHEST/LUNG: No use of accessory muscles, CTAB, breathing non-labored  COR: RR, no mrcg  ABD: Soft, ND/NT, +BS  PSYCH: AAOx3  NEUROLOGY: CN II-XII grossly intact, moving all extremities  SKIN: No rashes or lesions  EXT: no edema, cyanosis or clubbing noted    LABS:  CAPILLARY BLOOD GLUCOSE                              14.8   7.42  )-----------( 324      ( 15 Sep 2022 07:23 )             41.9     09-15    139  |  104  |  16  ----------------------------<  87  4.3   |  26  |  0.69    Ca    9.5      15 Sep 2022 07:23  Phos  3.1     09-15  Mg     2.30     09-15                  RADIOLOGY & ADDITIONAL TESTS:    Telemetry Personally Reviewed -     Imaging Personally Reviewed -     Imaging Reviewed -     Consultant(s) Notes Reviewed -       Care Discussed with Consultants/Other Providers -

## 2022-09-15 NOTE — CHART NOTE - NSCHARTNOTEFT_GEN_A_CORE
Chart reviewed  Elevated TSH 21 noted, Free T4 normal  Home levothyroxine 75 mcg po daily.  Recommend increase levothyroxine to 88 mcg po daily.  Take in AM on empty stomach, at least 60 minutes before food and other meds and 4 hour apart from iron, calcium, vitamins.  Repeat TFTs 4-6 weeks as outpatient.  Defer full consult at this time.  Please call if any questions.    Kelsea Loco MD  Division of Endocrinology  Pager: [phone]    If after 6PM or before 9AM, or on weekends/holidays, please call endocrine answering service for assistance ([phone]).  For nonurgent matters email [email or web address] for assistance.

## 2022-09-15 NOTE — PROGRESS NOTE ADULT - PROBLEM SELECTOR PLAN 3
Passed dysphagia screen  continue GF/dairy free diet Passed dysphagia screen  continue GF/dairy free diet  nutrition consult placed and recommend adding ensure TID

## 2022-09-15 NOTE — PROGRESS NOTE ADULT - PROBLEM SELECTOR PLAN 5
on admission T 100.6, WBC 13, may be reactive from IPH, no other symptoms  No further fevers, WBCs downtrending   CXR clear lungs and UA - No signs of infxn   f/u bcx sent by  ED and UCx ordered   Monitor off abx for now

## 2022-09-15 NOTE — CHART NOTE - NSCHARTNOTEFT_GEN_A_CORE
Consult- Assessment    Source: Patient A&Ox3 [x ]    Family [ x]     other [ x] Chart    76F PMH hypothyroidism, Celiac disease, allergies, dementia (B/l aaox2), 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 S/p MICU now on 7S.    Spoke with patient and daughter at bedside. Daughter states patient's appetite is improving since admission. Observed patient enjoying a brownie from home. As per daughter, pt is eating both food from here and food from home. Pt states she is also allergic to cherries and eggplant - will update in sunrise. Daughter reports pt is drinking Ensure and is requesting to receive them 3x daily. Daughter also reports patient takes long list of supplements at home, many 3x daily. No complaints of N/V/D/C. Denies any issues chewing or swallowing. No nutrition related questions at this time.      Diet, Regular:   Lactose Restricted (Milk Sugar Intoler.)  Milk Protein Restr(Milk Protein Allergy)  Gluten-Gliadin Restricted  No Dairy  Supplement Feeding Modality:  Oral  Ensure Plant-Based Cans or Servings Per Day:  1       Frequency:  Daily (09-12-22 @ 10:34)      GI: WDL. Last BM 9/14.     PO intake:  < 50% [ ] 50-75% [ ]   % [x ]  other :    Anthropometrics: Height (cm): 147.3 (09-08)  Weight (kg): 37.0 (09-11), 37.9 (09-08)   BMI (kg/m2): 17.5 (09-08)    Edema: No edema noted  Pressure Injuries: As per RN flowsheets, skin remains intact.     __________________ Pertinent Medications__________________   MEDICATIONS  (STANDING):  heparin   Injectable 5000 Unit(s) SubCutaneous every 12 hours  influenza  Vaccine (HIGH DOSE) 0.7 milliLiter(s) IntraMuscular once  levothyroxine 75 MICROGram(s) Oral daily  melatonin 3 milliGRAM(s) Oral at bedtime  multivitamin 1 Tablet(s) Oral daily  sodium chloride 0.9%. 1000 milliLiter(s) (75 mL/Hr) IV Continuous <Continuous>    MEDICATIONS  (PRN):  acetaminophen     Tablet .. 650 milliGRAM(s) Oral every 6 hours PRN Temp greater or equal to 38C (100.4F), Mild Pain (1 - 3)  fexofenadine 180 milliGRAM(s) 1 Tablet(s) Oral two times a day PRN Hives      __________________ Pertinent Labs__________________   09-15 Na139 mmol/L Glu 87 mg/dL K+ 4.3 mmol/L Cr  0.69 mg/dL BUN 16 mg/dL 09-15 Phos 3.1 mg/dL 09-11 Alb 4.2 g/dL        Estimated Needs:   2713-1072 connor/d based on 30-35kcals/kg  45.36- 52.96 gm pro/d    [ x] no change since previous assessment      Previous Nutrition Diagnosis: Severe protein-calorie malnutrition     Nutrition Diagnosis is [x ] ongoing  [ ] resolved [ ] not applicable     Recommendations:  1) Continue regular gluten-free, lactose restricted, milk-protein restricted diet  2) Recommend increasing frequency ensure plant-based to 3x daily  3) Recommend medical team review home supplementation list  4) Continue MVI daily  5) Continue to monitor weights, labs, PO intake  6) RD to follow up PRN        Monitoring and Evaluation:      [ x] Tolerance to diet prescription [x ] weights [x ] follow up per protocol  [ ] other:

## 2022-09-16 ENCOUNTER — TRANSCRIPTION ENCOUNTER (OUTPATIENT)
Age: 77
End: 2022-09-16

## 2022-09-16 VITALS
RESPIRATION RATE: 18 BRPM | SYSTOLIC BLOOD PRESSURE: 118 MMHG | OXYGEN SATURATION: 100 % | TEMPERATURE: 97 F | HEART RATE: 87 BPM | DIASTOLIC BLOOD PRESSURE: 60 MMHG

## 2022-09-16 LAB
ANION GAP SERPL CALC-SCNC: 9 MMOL/L — SIGNIFICANT CHANGE UP (ref 7–14)
BUN SERPL-MCNC: 15 MG/DL — SIGNIFICANT CHANGE UP (ref 7–23)
CALCIUM SERPL-MCNC: 9.3 MG/DL — SIGNIFICANT CHANGE UP (ref 8.4–10.5)
CHLORIDE SERPL-SCNC: 103 MMOL/L — SIGNIFICANT CHANGE UP (ref 98–107)
CO2 SERPL-SCNC: 24 MMOL/L — SIGNIFICANT CHANGE UP (ref 22–31)
CREAT SERPL-MCNC: 0.6 MG/DL — SIGNIFICANT CHANGE UP (ref 0.5–1.3)
EGFR: 94 ML/MIN/1.73M2 — SIGNIFICANT CHANGE UP
GLUCOSE SERPL-MCNC: 85 MG/DL — SIGNIFICANT CHANGE UP (ref 70–99)
HCT VFR BLD CALC: 41.1 % — SIGNIFICANT CHANGE UP (ref 34.5–45)
HGB BLD-MCNC: 13.7 G/DL — SIGNIFICANT CHANGE UP (ref 11.5–15.5)
MAGNESIUM SERPL-MCNC: 2.1 MG/DL — SIGNIFICANT CHANGE UP (ref 1.6–2.6)
MCHC RBC-ENTMCNC: 31.6 PG — SIGNIFICANT CHANGE UP (ref 27–34)
MCHC RBC-ENTMCNC: 33.3 GM/DL — SIGNIFICANT CHANGE UP (ref 32–36)
MCV RBC AUTO: 94.7 FL — SIGNIFICANT CHANGE UP (ref 80–100)
NRBC # BLD: 0 /100 WBCS — SIGNIFICANT CHANGE UP (ref 0–0)
NRBC # FLD: 0 K/UL — SIGNIFICANT CHANGE UP (ref 0–0)
PHOSPHATE SERPL-MCNC: 3.5 MG/DL — SIGNIFICANT CHANGE UP (ref 2.5–4.5)
PLATELET # BLD AUTO: 311 K/UL — SIGNIFICANT CHANGE UP (ref 150–400)
POTASSIUM SERPL-MCNC: 3.8 MMOL/L — SIGNIFICANT CHANGE UP (ref 3.5–5.3)
POTASSIUM SERPL-SCNC: 3.8 MMOL/L — SIGNIFICANT CHANGE UP (ref 3.5–5.3)
RBC # BLD: 4.34 M/UL — SIGNIFICANT CHANGE UP (ref 3.8–5.2)
RBC # FLD: 12.4 % — SIGNIFICANT CHANGE UP (ref 10.3–14.5)
SODIUM SERPL-SCNC: 136 MMOL/L — SIGNIFICANT CHANGE UP (ref 135–145)
WBC # BLD: 7.19 K/UL — SIGNIFICANT CHANGE UP (ref 3.8–10.5)
WBC # FLD AUTO: 7.19 K/UL — SIGNIFICANT CHANGE UP (ref 3.8–10.5)

## 2022-09-16 PROCEDURE — 99239 HOSP IP/OBS DSCHRG MGMT >30: CPT

## 2022-09-16 RX ORDER — ACETAMINOPHEN 500 MG
2 TABLET ORAL
Qty: 0 | Refills: 0 | DISCHARGE
Start: 2022-09-16

## 2022-09-16 RX ORDER — LEVOTHYROXINE SODIUM 125 MCG
1 TABLET ORAL
Qty: 3 | Refills: 0
Start: 2022-09-16 | End: 2022-09-18

## 2022-09-16 RX ORDER — LEVOTHYROXINE SODIUM 125 MCG
1 TABLET ORAL
Qty: 0 | Refills: 0 | DISCHARGE

## 2022-09-16 RX ORDER — LEVOTHYROXINE SODIUM 125 MCG
1 TABLET ORAL
Qty: 30 | Refills: 0
Start: 2022-09-16 | End: 2022-10-15

## 2022-09-16 RX ADMIN — Medication 1 TABLET(S): at 11:30

## 2022-09-16 RX ADMIN — Medication 88 MICROGRAM(S): at 06:05

## 2022-09-16 NOTE — DISCHARGE NOTE PROVIDER - NSDCMRMEDTOKEN_GEN_ALL_CORE_FT
Synthroid 75 mcg (0.075 mg) oral tablet: 1 tab(s) orally once a day   levothyroxine 88 mcg (0.088 mg) oral tablet: 1 tab(s) orally once a day   acetaminophen 325 mg oral tablet: 2 tab(s) orally every 4 hours  levothyroxine 88 mcg (0.088 mg) oral tablet: 1 tab(s) orally once a day   acetaminophen 325 mg oral tablet: 2 tab(s) orally every 6 hours, As Needed for pain  levothyroxine 88 mcg (0.088 mg) oral tablet: 1 tab(s) orally once a day   acetaminophen 325 mg oral tablet: 2 tab(s) orally every 6 hours, As Needed for pain  levothyroxine 88 mcg (0.088 mg) oral tablet: 1 tab(s) orally once a day  Synthroid 88 mcg (0.088 mg) oral tablet: 1 tab(s) orally once a day for 3 days

## 2022-09-16 NOTE — DISCHARGE NOTE PROVIDER - DETAILS OF MALNUTRITION DIAGNOSIS/DIAGNOSES
This patient has been assessed with a concern for Malnutrition and was treated during this hospitalization for the following Nutrition diagnosis/diagnoses:     -  09/09/2022: Severe protein-calorie malnutrition   -  09/09/2022: Underweight (BMI < 19)

## 2022-09-16 NOTE — DISCHARGE NOTE PROVIDER - CARE PROVIDER_API CALL
Clyde Lawrence)  Neurosurgery  General  [address], [address]  [place] [address]  Phone: [phone]  Fax: [phone]  Follow Up Time:     Daniel Sy)  Clinical Neurophysiology; Neurology  [address] [place] [address]  Phone: [phone]  Fax: [phone]  Follow Up Time:     Hussain Stearns  ENDOCRINOLOGY/METAB/DIABETES  [address] [place] [address], [address]  [place], [place] [id number]  Phone: [phone]  Fax: [phone]  Follow Up Time:     Sage Avalos)  Follow Up Time:

## 2022-09-16 NOTE — DISCHARGE NOTE PROVIDER - PROVIDER TOKENS
PROVIDER:[TOKEN:[[id number]]],PROVIDER:[TOKEN:[[id number]]],PROVIDER:[TOKEN:[[id number]]],PROVIDER:[TOKEN:[[id number]]]

## 2022-09-16 NOTE — DISCHARGE NOTE PROVIDER - HOSPITAL COURSE
76F PMH hypothyroidism, Celiac disease, allergies, dementia (B/l aaox2), PSH PDA repair as a child, p/w "not being her normal self". Hx from son -Snian and daughter - Samantha at bedside. Pt has dementia and poor memory for past 6-7 months, often asking repetitive questions. At baseline very active, walks 3 miles a day, converses normally, just poor memory. Baseline aaox2. Monday night, pt c/o HA. Wed pt noted to be more "sleepy", but family thought she may have just been tired from going to sleep late the night prior. Then today, Thursday, she was sleeping later than usual. When daughter went to check on her, she was standing in front of patient on patient's L side but her mother was unable to see her. She also noted an abnormal gait. This prompted her to bring her to the ED. She also noted a bruise on the L forehead of patient and L upper thigh, thinks she may have fell in the shower possibly. Daughter notes pt tends to deny having symptoms so as not to worry the family. Pt aaox2, talkative and pleasant. Poor memory during conversation. Denies any HA, CP, SOB, abd pain, n/v/d. She was unable to specify if she has visual changes. Denies any focal weakness, numbness tingling. Reports tenderness at location of L forehead. Pt is not on any blood thinners, denies any OTC ASA or NSAIDs. Pt unsure if she had any head trauma. T 100.6, HR 87, /58, RR 18, 99%. CTH revealed "A 4.1 x 2.1 x 3.9 cm focus of hyperdensity within the right occipital lobe with surrounding edema, consistent with acute hemorrhage. There is extension of hemorrhage into the right lateral ventricle occipital horn." CTA with " IMPRESSION: Normal CTA of the neck. There is slightly increased vascularity in the right occipital lobe medial to the parenchymal hemorrhage suspicious for an anterior venous malformation"        76F PMH hypothyroidism, Celiac disease, allergies, dementia (B/l aaox2), PSH PDA repair as a child, p/w "not being her normal self". Hx from son -Sinan and daughter - Samantha at bedside. Pt has dementia and poor memory for past 6-7 months, often asking repetitive questions. At baseline very active, walks 3 miles a day, converses normally, just poor memory. Baseline aaox2. Monday night, pt c/o HA. Wed pt noted to be more "sleepy", but family thought she may have just been tired from going to sleep late the night prior. Then today, Thursday, she was sleeping later than usual. When daughter went to check on her, she was standing in front of patient on patient's L side but her mother was unable to see her. She also noted an abnormal gait. This prompted her to bring her to the ED. She also noted a bruise on the L forehead of patient and L upper thigh, thinks she may have fell in the shower possibly. Daughter notes pt tends to deny having symptoms so as not to worry the family. Pt aaox2, talkative and pleasant. Poor memory during conversation. Denies any HA, CP, SOB, abd pain, n/v/d. She was unable to specify if she has visual changes. Denies any focal weakness, numbness tingling. Reports tenderness at location of L forehead. Pt is not on any blood thinners, denies any OTC ASA or NSAIDs. Pt unsure if she had any head trauma. T 100.6, HR 87, /58, RR 18, 99%. CTH revealed "A 4.1 x 2.1 x 3.9 cm focus of hyperdensity within the right occipital lobe with surrounding edema, consistent with acute hemorrhage. There is extension of hemorrhage into the right lateral ventricle occipital horn." CTA with " IMPRESSION: Normal CTA of the neck. There is slightly increased vascularity in the right occipital lobe medial to the parenchymal hemorrhage suspicious for an anterior venous malformation"     Patient was admitted for acute cerebral hemorrhage. CTH was done 9/8 which showed hemorrhage in right occipital lobe. 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 Repeat CT head showed no progression of IPH. Repeat CT head was done on 9/15. Neurosurgery was consulted. As per neurosurgery, CT head not suspicious for underlying vascular malformation and no need for any intervention. Blood pressure was controlled. Patient will not go home on any antiplatelets or anticoagulation as per neurosurgery. Patient was seen for hypothyroidism and synthroid was increased.     On 9/16/22 this case was reviewed with Dr. Ricketts, the patient is medically stable and optimized for discharge. All medications were reviewed and prescriptions were sent to mutually agreed upon pharmacy.                  76F PMH hypothyroidism, Celiac disease, allergies, dementia (B/l aaox2), PSH PDA repair as a child, hx of Grave's disease presented with change in mental status. As per family, pt has dementia and poor memory for past 6-7 months, often asking repetitive questions. At baseline very active, walks 3 miles a day, converses normally, just poor memory. Prior to admission, pt complained of headache and noted to be more sleepy. As per family, pt  was unable to see her. She also noted an abnormal gait. This prompted daughter to bring patient to the ED. She also noted a bruise on the L forehead of patient and L upper thigh, thinks she may have fell in the shower possibly.     In ED, CT head revealed "A 4.1 x 2.1 x 3.9 cm focus of hyperdensity within the right occipital lobe with surrounding edema, consistent with acute hemorrhage. There is extension of hemorrhage into the right lateral ventricle occipital horn."   CTA with " IMPRESSION: Normal CTA of the neck. There is slightly increased vascularity in the right occipital lobe medial to the parenchymal hemorrhage suspicious for an anterior venous malformation"     Patient was admitted for acute cerebral hemorrhage and transferred to MICU. In MICU, she was monitored and patient BP goal was to be below 160/90 and was maintained. Repeat CT head showed no progression of IPH and transferred to medical floor.     CTH was done 9/8 which showed hemorrhage in right occipital lobe. 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 Repeat CT head showed no progression of IPH. Repeat CT head was done on 9/15. Neurosurgery was consulted. As per neurosurgery, CT head not suspicious for underlying vascular malformation and no need for any intervention.     During hospitalization, Blood pressure was controlled with goal <160/90 and was maintained. Shepassed dysphagia study and continued on gluten free, dairy free diet.   As per neurosurgery, pt should not be sent home on any antiplatelets or anticoagulation until cleared by neurosurgery outpatient.     Patient had elevated TSH with normal Free T4 1.1 and endo was consulted. They recommended increasing Synthroid to 88mcg and to follow up outpt with endocrinologist for repeat labs within 4-6 weeks as outpatient.     She also noted to have fever of 100.6'F during hospitalization and likely etiology may be reactive from IPH, no localizing symptom. pt remained afebrile during hospitalization and WBC improved. Blood culture and urine culture negative. She was not continued on antibiotics due to no signs of infection.     Pt was evaluated by physical therapy who recommended outpt PT.     Patient has improved and hemodynamically stable for discharge home. Discussed plan with daughter, Samantha, and patient. All questions answered and in agreement with discharge.     On 9/16/22 this case was reviewed with Dr. Ricketts, the patient is medically optimized for discharge. All medications were reviewed and prescriptions were sent to mutually agreed upon pharmacy.

## 2022-09-16 NOTE — DISCHARGE NOTE NURSING/CASE MANAGEMENT/SOCIAL WORK - NSSCNAMETXT_GEN_ALL_CORE
[facility] [place] [phone].  Nurse to visit on the day after discharge.  Other appropriate services to be arranged thereafter.

## 2022-09-16 NOTE — DISCHARGE NOTE PROVIDER - NSDCCPCAREPLAN_GEN_ALL_CORE_FT
PRINCIPAL DISCHARGE DIAGNOSIS  Diagnosis: Cerebral hemorrhage  Assessment and Plan of Treatment: You were admitted for acute cerebral hemorrhage (brain bleed). CT scan of your head was done 9/8 which showed a bleed in right occipital lobe. CTA scan of your head on 9/9 showed a possible anterior venous malformation. Repeat CT head showed no progression of the bleed. Repeat CT head was done on 9/15. Neurosurgery was consulted. As per neurosurgery, CT head not suspicious for underlying vascular malformation and no need for any intervention. Blood pressure was controlled. You will not go home on any antiplatelets or anticoagulation as per neurosurgery. Please follow up with your primary care physician and neurosurgeon in 1 week after discharge for continued monitoring and management.      SECONDARY DISCHARGE DIAGNOSES  Diagnosis: Hypothyroidism  Assessment and Plan of Treatment: Your synrthoid dose was increased. Please follow up with your primary care physician and endocrinologist after discharge for continued monitoring and management.     PRINCIPAL DISCHARGE DIAGNOSIS  Diagnosis: Cerebral hemorrhage  Assessment and Plan of Treatment: You were admitted for acute cerebral hemorrhage (brain bleed). CT scan of your head was done 9/8 which showed a bleed in right occipital lobe. CTA scan of your head on 9/9 showed a possible anterior venous malformation. Repeat CT head showed no progression of the bleed. Repeat CT head was done on 9/15. Neurosurgery was consulted. As per neurosurgery, CT head not suspicious for underlying vascular malformation and no need for any intervention. Blood pressure was controlled. You will not go home on any antiplatelets or anticoagulation as per neurosurgery. Please follow up with your primary care physician, neurology and neurosurgeon in 1 week after discharge for continued monitoring and management.  return to ED if headache is worsening accompanied with nausea/vomiting, change in mental status, dizziness, chest pain, shortness of breath.      SECONDARY DISCHARGE DIAGNOSES  Diagnosis: Hypothyroidism  Assessment and Plan of Treatment: Your synrthoid dose was increased. Please follow up with your primary care physician and endocrinologist after discharge for continued monitoring and management. You will need repeat labs 4-6 weeks after discharge.    Diagnosis: Celiac disease  Assessment and Plan of Treatment: Continue gluten free and dairy free diet. Follow up with you nutritionist

## 2022-09-16 NOTE — DISCHARGE NOTE PROVIDER - ATTENDING DISCHARGE PHYSICAL EXAMINATION:
GENERAL: NAD, frail, very pleasant pt  HEAD:  Atraumatic, Normocephalic, MMM  CHEST/LUNG: No use of accessory muscles, CTA B/L, breathing non-labored  HEART: regular rate and rhythm, normal S1/S2  ABD: Soft, Nondistended, nontender, +BS  PSYCH: AAOx3  NEUROLOGY: CN II-XII grossly intact, moving all extremities  SKIN: No rashes or lesions  EXT: no edema, cyanosis or clubbing noted

## 2022-09-16 NOTE — CHART NOTE - NSCHARTNOTEFT_GEN_A_CORE
rpt CTA reviewed with Dr. Lawrence and Dr. Haji. No need for formal angiogram at this time. Not suspicious for underlying vascular malformation, likely hypertensive hemorrhage. Recommend outpatient follow up with Dr. Lawrence in 1 week post discharge. Hold all full dose AC until cleared by neurosurgery outpatient.     < from: MR Head w/wo IV Cont (09.12.22 @ 21:28) >    Stable evolving intraparenchymal hemorrhagic focus in the right   parieto-occipital region centered about the gray-white matter junction   with associated adjacent vasogenic edema and suggestion of acute on   subacute nature timeline.    No current underlying enhancing focus/mass concerning for malignancy.    No midline shift nor concern for impending herniation.

## 2022-09-16 NOTE — DISCHARGE NOTE PROVIDER - CARE PROVIDERS DIRECT ADDRESSES
,[email or web address],DirectAddress_Unknown,DirectAddress_Unknown,DirectAddress_Unknown

## 2022-09-16 NOTE — DISCHARGE NOTE PROVIDER - NSDCQMANTICOAGCONTRA_GEN_A_CORE
Patient does not have atrial fibrillation/flutter Patient does not have atrial fibrillation/flutter/Recent bleed or risk of bleeding

## 2022-09-16 NOTE — DISCHARGE NOTE NURSING/CASE MANAGEMENT/SOCIAL WORK - PATIENT PORTAL LINK FT
You can access the FollowMyHealth Patient Portal offered by [facility] by registering at the following website: [email or web address]. By joining [employer]’s FollowMyHealth portal, you will also be able to view your health information using other applications (apps) compatible with our system.

## 2022-09-19 PROBLEM — T83.113D: Chronic | Status: ACTIVE | Noted: 2022-09-08

## 2022-09-19 PROBLEM — Z86.39 PERSONAL HISTORY OF OTHER ENDOCRINE, NUTRITIONAL AND METABOLIC DISEASE: Chronic | Status: ACTIVE | Noted: 2022-09-08

## 2022-09-19 PROBLEM — E03.9 HYPOTHYROIDISM, UNSPECIFIED: Chronic | Status: ACTIVE | Noted: 2022-09-08

## 2022-09-29 PROBLEM — T14.8XXA HEMATOMA: Status: ACTIVE | Noted: 2022-09-29

## 2022-09-30 ENCOUNTER — APPOINTMENT (OUTPATIENT)
Dept: NEUROSURGERY | Facility: CLINIC | Age: 77
End: 2022-09-30

## 2022-09-30 VITALS
OXYGEN SATURATION: 98 % | WEIGHT: 82 LBS | HEIGHT: 58 IN | HEART RATE: 76 BPM | SYSTOLIC BLOOD PRESSURE: 119 MMHG | BODY MASS INDEX: 17.21 KG/M2 | DIASTOLIC BLOOD PRESSURE: 76 MMHG

## 2022-09-30 DIAGNOSIS — Z78.9 OTHER SPECIFIED HEALTH STATUS: ICD-10-CM

## 2022-09-30 DIAGNOSIS — Z86.39 PERSONAL HISTORY OF OTHER ENDOCRINE, NUTRITIONAL AND METABOLIC DISEASE: ICD-10-CM

## 2022-09-30 DIAGNOSIS — Z87.19 PERSONAL HISTORY OF OTHER DISEASES OF THE DIGESTIVE SYSTEM: ICD-10-CM

## 2022-09-30 DIAGNOSIS — T14.8XXA OTHER INJURY OF UNSPECIFIED BODY REGION, INITIAL ENCOUNTER: ICD-10-CM

## 2022-09-30 DIAGNOSIS — Z87.39 PERSONAL HISTORY OF OTHER DISEASES OF THE MUSCULOSKELETAL SYSTEM AND CONNECTIVE TISSUE: ICD-10-CM

## 2022-09-30 PROCEDURE — 99215 OFFICE O/P EST HI 40 MIN: CPT

## 2022-10-03 PROBLEM — Z78.9 NON-SMOKER: Status: ACTIVE | Noted: 2022-10-03

## 2022-10-03 PROBLEM — Z87.19 HISTORY OF GASTROESOPHAGEAL REFLUX (GERD): Status: RESOLVED | Noted: 2022-10-03 | Resolved: 2022-10-03

## 2022-10-03 PROBLEM — Z87.39 HISTORY OF OSTEOPOROSIS: Status: RESOLVED | Noted: 2022-10-03 | Resolved: 2022-10-03

## 2022-10-03 PROBLEM — Z86.39 HISTORY OF THYROID DISORDER: Status: RESOLVED | Noted: 2022-10-03 | Resolved: 2022-10-03

## 2022-10-05 NOTE — ASSESSMENT
[FreeTextEntry1] : IMPRESSION: CTA BRAIN 9/15/22\par \par No large vessel occlusion, significant stenosis or other vascular abnormality identified.\par \par Interval decrease in size of right parieto-occipital hematoma with mildly decreased mass effect. No midline shift.\par \par Previously seen intraventricular blood has resolved.\par \par IMPRESSION: MRA BRAIN 9/12/22\par Stable evolving intraparenchymal hemorrhagic focus in the right parieto-occipital region centered about the gray-white matter junction with associated adjacent vasogenic edema and suggestion of acute on subacute nature timeline.\par \par No current underlying enhancing focus/mass concerning for malignancy.\par No midline shift nor concern for impending herniation.\par Continued follow-up recommended.\par \par PLAN:\par 1. F/U with Dr. Sy.\par 2. F/U MRI brain in 3 months.\par

## 2022-10-05 NOTE — PHYSICAL EXAM
[General Appearance - Alert] : alert [General Appearance - In No Acute Distress] : in no acute distress [General Appearance - Well Nourished] : well nourished [General Appearance - Well-Appearing] : healthy appearing [Oriented To Time, Place, And Person] : oriented to person, place, and time [Affect] : the affect was normal [Memory Recent] : recent memory was not impaired [Person] : oriented to person [Place] : oriented to place [Time] : oriented to time [Motor Tone] : muscle tone was normal in all four extremities [Sclera] : the sclera and conjunctiva were normal [Outer Ear] : the ears and nose were normal in appearance [Neck Appearance] : the appearance of the neck was normal [] : no respiratory distress [Respiration, Rhythm And Depth] : normal respiratory rhythm and effort [Exaggerated Use Of Accessory Muscles For Inspiration] : no accessory muscle use [Heart Rate And Rhythm] : heart rate was normal and rhythm regular [Abnormal Walk] : normal gait [Involuntary Movements] : no involuntary movements were seen [FreeTextEntry1] :  hives

## 2022-10-05 NOTE — REVIEW OF SYSTEMS
[Confused or Disoriented] : confusion [Memory Lapses or Loss] : memory loss [Decr. Concentrating Ability] : decreased concentrating ability [Changed Thought Patterns] : changed thought patterns [Repeating Questions] : repeated questioning about recent events [Dizziness] : dizziness [Lightheadedness] : lightheadedness [Sleep Disturbances] : sleep disturbances [Anxiety] : anxiety [Palpitations] : palpitations [FreeTextEntry5] : occasionally [de-identified] : chronic hives

## 2022-10-05 NOTE — HISTORY OF PRESENT ILLNESS
[de-identified] : Nicole Wright is a pleasant 76 year old lady who had a fall on 9/7/22.  She had a period of confusion and was subsequently hospitalized with a right parieto-occipital hematoma..\par \par Today she presents for a follow up visit with new MRI brain done on 9/28/22.  Pt is also under the care of Dr. Sy since 2016 for memory loss, mild cognitive impairment, dementia per daughter.

## 2022-11-09 ENCOUNTER — APPOINTMENT (OUTPATIENT)
Dept: OPHTHALMOLOGY | Facility: CLINIC | Age: 77
End: 2022-11-09

## 2022-11-09 ENCOUNTER — NON-APPOINTMENT (OUTPATIENT)
Age: 77
End: 2022-11-09

## 2022-11-09 PROCEDURE — 99204 OFFICE O/P NEW MOD 45 MIN: CPT

## 2022-11-09 PROCEDURE — 92083 EXTENDED VISUAL FIELD XM: CPT

## 2022-11-09 PROCEDURE — 92133 CPTRZD OPH DX IMG PST SGM ON: CPT

## 2023-02-14 ENCOUNTER — APPOINTMENT (OUTPATIENT)
Dept: ORTHOPEDIC SURGERY | Facility: CLINIC | Age: 78
End: 2023-02-14
Payer: MEDICARE

## 2023-02-14 VITALS — WEIGHT: 82 LBS | HEIGHT: 58 IN | BODY MASS INDEX: 17.21 KG/M2

## 2023-02-14 PROCEDURE — 99203 OFFICE O/P NEW LOW 30 MIN: CPT

## 2023-02-14 RX ORDER — LIDOCAINE 5% 700 MG/1
5 PATCH TOPICAL
Qty: 30 | Refills: 0 | Status: ACTIVE | COMMUNITY
Start: 2023-02-14 | End: 1900-01-01

## 2023-02-14 RX ORDER — METHYLPREDNISOLONE 4 MG/1
4 TABLET ORAL
Qty: 1 | Refills: 0 | Status: ACTIVE | COMMUNITY
Start: 2023-02-14 | End: 1900-01-01

## 2023-02-14 NOTE — IMAGING
[Outside films reviewed] : Outside films reviewed [de-identified] : PE thoracic spine: +tenderness to R paraspinals, +ecchymosis, no midline tenderness, limited motion due to pain, able to SLR with pain, motor and sensory intact, NVI\par

## 2023-02-14 NOTE — HISTORY OF PRESENT ILLNESS
[Mid-back] : mid-back [Lower back] : lower back [8] : 8 [Dull/Aching] : dull/aching [Radiating] : radiating [Sharp] : sharp [Shooting] : shooting [Standing] : standing [Stairs] : stairs [Walking] : walking [Retired] : Work status: retired [de-identified] : 76 y/o F with R side thoracic back pain. Denies numbness/tingling. denies bladder or bowel issues. She has tried tylenol without relief. denies chest pain or difficulty breathing.\par here with daughter.\par denies DM.\par PMHx: brain bleed, dementia, thyroid dz [] : Post Surgical Visit: no [FreeTextEntry3] : 2/13/23 [FreeTextEntry5] : Patient fell down trying clean the blinds in her house on 2/13/23\par complains of back pain\par brought X rays from [facility] MD

## 2023-02-14 NOTE — ASSESSMENT
[FreeTextEntry1] : A/P Back contusion without radiculopathy\par - medrol dose lamont, daughter will discuss medication with PMD tomorrow\par - muscle relaxant\par - lidocaine patch\par - ice\par - f/u spine\par

## 2023-02-21 ENCOUNTER — APPOINTMENT (OUTPATIENT)
Dept: ORTHOPEDIC SURGERY | Facility: CLINIC | Age: 78
End: 2023-02-21
Payer: MEDICARE

## 2023-02-21 VITALS — WEIGHT: 82 LBS | BODY MASS INDEX: 17.21 KG/M2 | HEIGHT: 58 IN

## 2023-02-21 DIAGNOSIS — S20.229A CONTUSION OF UNSPECIFIED BACK WALL OF THORAX, INITIAL ENCOUNTER: ICD-10-CM

## 2023-02-21 DIAGNOSIS — M54.9 DORSALGIA, UNSPECIFIED: ICD-10-CM

## 2023-02-21 PROCEDURE — 99213 OFFICE O/P EST LOW 20 MIN: CPT

## 2023-02-21 PROCEDURE — 72070 X-RAY EXAM THORAC SPINE 2VWS: CPT

## 2023-02-21 NOTE — HISTORY OF PRESENT ILLNESS
[Mid-back] : mid-back [7] : 7 [Dull/Aching] : dull/aching [Tightness] : tightness [Intermittent] : intermittent [Nothing helps with pain getting better] : Nothing helps with pain getting better [de-identified] : 76 yo F presenting with right sided thoracic pain x 2/13 after falling and hitting her back on the vanity. had severe flare up yesterday after sneezing but now better today. takin muscle relaxnats which help. denies radicular complaints, denies n/t. \par \par pmhx - osteoporosis [] : no [FreeTextEntry3] : 02/13/2023 [FreeTextEntry5] : RITO HERNÁNDEZ is a 77 year old female presenting with mid back pain that occurred after falling. Was seen at [facility]; had XR completed. Painful when sneezing. More painful at night.

## 2023-02-21 NOTE — ASSESSMENT
[FreeTextEntry1] : no obvious wedge deformities on XR; the fall and OP dx predisposes her to fracture; shes on an improving trend; will give more time; advised natural history of compression fractures; if symptoms persist/worsen over the next few weeks she'll give us a call and we'll set her up for MRI TS; muscle relaxants renewed\par \par Progress note completed by Bozena Townsend PA-C under the supervision of Karthik Eastman MD\par

## 2023-02-21 NOTE — PHYSICAL EXAM
[] : motor exam is 5/5 throughout both lower extremities with normal tone [No spinal deformity, fracture, lytic lesion, or marked single level collapse] : No spinal deformity, fracture, lytic lesion, or marked single level collapse

## 2023-03-06 ENCOUNTER — APPOINTMENT (OUTPATIENT)
Dept: ORTHOPEDIC SURGERY | Facility: CLINIC | Age: 78
End: 2023-03-06
Payer: MEDICARE

## 2023-03-06 VITALS — WEIGHT: 82 LBS | HEIGHT: 58 IN | BODY MASS INDEX: 17.21 KG/M2

## 2023-03-06 DIAGNOSIS — M54.6 PAIN IN THORACIC SPINE: ICD-10-CM

## 2023-03-06 PROCEDURE — 99213 OFFICE O/P EST LOW 20 MIN: CPT

## 2023-03-06 NOTE — ASSESSMENT
[FreeTextEntry1] : no obvious wedge deformities on XR; the fall and OP dx predisposes her to fracture; she has intractable pain; MRI indicated to eval compression fracture; will call with results once done\par \par Progress note completed by Bozena Townsend PA-C under the supervision of Karthik Eastman MD\par

## 2023-03-06 NOTE — HISTORY OF PRESENT ILLNESS
[Mid-back] : mid-back [Dull/Aching] : dull/aching [Tightness] : tightness [Intermittent] : intermittent [Nothing helps with pain getting better] : Nothing helps with pain getting better [7] : 7 [de-identified] : 3/6/23: f/u TS. States pain is severe and not responding to time and meds. requesting MRI. \par \par 76 yo F presenting with right sided thoracic pain x 2/13 after falling and hitting her back on the vanity. had severe flare up yesterday after sneezing but now better today. takin muscle relaxnats which help. denies radicular complaints, denies n/t. \par \par pmhx - osteoporosis [] : no [FreeTextEntry3] : 02/13/2023 [FreeTextEntry5] : RITO HERNÁNDEZ is a 77 year old female presenting with mid back pain that occurred after falling. Was seen at [facility]; had XR completed. Painful when sneezing. More painful at night.

## 2023-03-08 ENCOUNTER — FORM ENCOUNTER (OUTPATIENT)
Age: 78
End: 2023-03-08

## 2023-03-09 ENCOUNTER — APPOINTMENT (OUTPATIENT)
Dept: MRI IMAGING | Facility: CLINIC | Age: 78
End: 2023-03-09
Payer: MEDICARE

## 2023-03-09 PROCEDURE — 72146 MRI CHEST SPINE W/O DYE: CPT | Mod: MH

## 2023-03-14 ENCOUNTER — NON-APPOINTMENT (OUTPATIENT)
Age: 78
End: 2023-03-14

## 2023-03-14 RX ORDER — CYCLOBENZAPRINE HYDROCHLORIDE 5 MG/1
5 TABLET, FILM COATED ORAL
Qty: 40 | Refills: 0 | Status: ACTIVE | COMMUNITY
Start: 2023-02-14 | End: 1900-01-01

## 2023-03-30 ENCOUNTER — APPOINTMENT (OUTPATIENT)
Dept: ORTHOPEDIC SURGERY | Facility: CLINIC | Age: 78
End: 2023-03-30

## 2023-04-19 ENCOUNTER — APPOINTMENT (OUTPATIENT)
Dept: OPHTHALMOLOGY | Facility: CLINIC | Age: 78
End: 2023-04-19

## 2023-05-19 NOTE — PATIENT PROFILE ADULT - NSPRESCRALCFREQ_GEN_A_NUR
Pt oxygen sat 86%, upon entering room pt noted to be starring off in room shallow respirations. Pt startles at verbal command. Pt unable to follow cues on deep breathing. Oxygen placed via NC at 2 liters oxygen sat hovers at 88%, oxygen bumped to 4 liters sat 94%. Dr. Llamas Class updated on pt's need for oxygen.  remains at bedside. Will continue to monitor. ITA Tejada RN  05/19/23 5049 Monthly or less

## 2023-07-14 ENCOUNTER — NON-APPOINTMENT (OUTPATIENT)
Age: 78
End: 2023-07-14

## 2023-07-14 ENCOUNTER — APPOINTMENT (OUTPATIENT)
Dept: OPHTHALMOLOGY | Facility: CLINIC | Age: 78
End: 2023-07-14
Payer: MEDICARE

## 2023-07-14 PROCEDURE — 92083 EXTENDED VISUAL FIELD XM: CPT

## 2023-07-14 PROCEDURE — 92133 CPTRZD OPH DX IMG PST SGM ON: CPT

## 2023-07-14 PROCEDURE — 92015 DETERMINE REFRACTIVE STATE: CPT

## 2023-07-14 PROCEDURE — 99214 OFFICE O/P EST MOD 30 MIN: CPT
